# Patient Record
Sex: FEMALE | Race: WHITE | NOT HISPANIC OR LATINO | Employment: FULL TIME | URBAN - METROPOLITAN AREA
[De-identification: names, ages, dates, MRNs, and addresses within clinical notes are randomized per-mention and may not be internally consistent; named-entity substitution may affect disease eponyms.]

---

## 2017-10-16 ENCOUNTER — TELEPHONE (OUTPATIENT)
Dept: NEUROLOGY | Facility: CLINIC | Age: 42
End: 2017-10-16

## 2017-10-16 NOTE — TELEPHONE ENCOUNTER
Called the patient and informed her that we don't have anything available for a sooner appointment. She stated that she will continue to try again so that she may luck up a have a cancellation slot open.

## 2017-10-16 NOTE — TELEPHONE ENCOUNTER
----- Message from Manny Ly sent at 10/16/2017  8:39 AM CDT -----  Contact: Self @ work: [phone] or cell: [phone] (call work until 2:30 PM, then call cell)  Pt would like to know if the doctor has an opening for an earlier date than appt on 01/17/2018.

## 2018-01-02 ENCOUNTER — OFFICE VISIT (OUTPATIENT)
Dept: OBSTETRICS AND GYNECOLOGY | Facility: CLINIC | Age: 43
End: 2018-01-02
Payer: COMMERCIAL

## 2018-01-02 ENCOUNTER — HOSPITAL ENCOUNTER (OUTPATIENT)
Dept: RADIOLOGY | Facility: HOSPITAL | Age: 43
Discharge: HOME | End: 2018-01-02
Attending: OBSTETRICS & GYNECOLOGY
Payer: COMMERCIAL

## 2018-01-02 VITALS
HEIGHT: 62 IN | WEIGHT: 164 LBS | BODY MASS INDEX: 30.31 KG/M2 | WEIGHT: 164.69 LBS | SYSTOLIC BLOOD PRESSURE: 122 MMHG | BODY MASS INDEX: 30.18 KG/M2 | HEIGHT: 62 IN | DIASTOLIC BLOOD PRESSURE: 76 MMHG

## 2018-01-02 DIAGNOSIS — Z12.31 VISIT FOR SCREENING MAMMOGRAM: Primary | ICD-10-CM

## 2018-01-02 DIAGNOSIS — N76.0 BV (BACTERIAL VAGINOSIS): ICD-10-CM

## 2018-01-02 DIAGNOSIS — B96.89 BV (BACTERIAL VAGINOSIS): ICD-10-CM

## 2018-01-02 DIAGNOSIS — Z12.31 VISIT FOR SCREENING MAMMOGRAM: ICD-10-CM

## 2018-01-02 PROCEDURE — 77063 BREAST TOMOSYNTHESIS BI: CPT | Mod: 26,,, | Performed by: RADIOLOGY

## 2018-01-02 PROCEDURE — 77067 SCR MAMMO BI INCL CAD: CPT | Mod: TC,PN

## 2018-01-02 PROCEDURE — 87220 TISSUE EXAM FOR FUNGI: CPT | Mod: S$GLB,,, | Performed by: OBSTETRICS & GYNECOLOGY

## 2018-01-02 PROCEDURE — 77067 SCR MAMMO BI INCL CAD: CPT | Mod: 26,,, | Performed by: RADIOLOGY

## 2018-01-02 PROCEDURE — 99204 OFFICE O/P NEW MOD 45 MIN: CPT | Mod: 25,S$GLB,, | Performed by: OBSTETRICS & GYNECOLOGY

## 2018-01-02 PROCEDURE — 99999 PR PBB SHADOW E&M-EST. PATIENT-LVL III: CPT | Mod: PBBFAC,,, | Performed by: OBSTETRICS & GYNECOLOGY

## 2018-01-02 PROCEDURE — 87210 SMEAR WET MOUNT SALINE/INK: CPT | Mod: QW,S$GLB,, | Performed by: OBSTETRICS & GYNECOLOGY

## 2018-01-02 RX ORDER — METRONIDAZOLE 7.5 MG/G
1 GEL VAGINAL NIGHTLY
Qty: 70 G | Refills: 3 | Status: SHIPPED | OUTPATIENT
Start: 2018-01-02 | End: 2018-01-07

## 2018-01-02 NOTE — PROGRESS NOTES
"Chief Complaint   Patient presents with    Vaginal Discharge    Breast Pain       History of Present Illness   42 y.o.  female patient presents today for vaginal d/c x 2-3 months amd right breast pain/pulling on and off x 8 months.     Past medical and surgical history reviewed.   I have reviewed the patient's medical history in detail and updated the computerized patient record.    Review of patient's allergies indicates:   Allergen Reactions    Cymbalta [duloxetine] Other (See Comments)         Review of Systems - Negative except HPI  GEN ROS: negative for - chills or fever  Breast ROS: negative for breast lumps  Genito-Urinary ROS: no dysuria, trouble voiding, or hematuria      Physical Examination:  /76   Ht 5' 2" (1.575 m)   Wt 74.7 kg (164 lb 10.9 oz)   LMP 12/19/2017 (Approximate)   BMI 30.12 kg/m²    Constitutional: She is oriented to person, place, and time. She appears well-developed and well-nourished. No distress.   HENT:   Head: Normocephalic and atraumatic.   Eyes: Conjunctivae and EOM are normal. No scleral icterus.   Neck: Normal range of motion. Neck supple. No tracheal deviation present.   Cardiovascular: Normal rate.    Pulmonary/Chest: Effort normal. No respiratory distress. She exhibits no tenderness.  Breasts: are symmetrical   Right breast exhibits no inverted nipple, no mass, no nipple discharge, no skin change and no tenderness.   Left breast exhibits no inverted nipple, no mass, no nipple discharge, no skin change and no tenderness.  Abdominal: Soft. She exhibits no distension and no mass. There is no tenderness. There is no rebound and no guarding.   Genitourinary:    External rectal exam shows no thrombosed external hemorrhoids.    Pelvic exam was performed with patient supine.   No labial fusion.   There is no rash, lesion or injury on the right labia.   There is no rash, lesion or injury on the left labia.   No bleeding and no signs of injury around the vaginal " introitus, urethra is without lesions and well supported. The cervix is visualized with no discharge, lesions or friability. IUD string in place, declined PAP offer.   No vaginal discharge found. Wetprep: occasional cue cells, no yeast or trichomaonds, KPH cpnfirmed and shiff negative.    No significant Cystocele, Enterocele or rectocele, and uterus well supported.   Bimanual exam:   The urethra is normal to palpation and there are no palpable vaginal wall masses.   Uterus is not deviated, not enlarged, not fixed, normal shape and not tender.   Cervix exhibits no motion tenderness.    Right adnexum displays no mass and no tenderness.   Left adnexum displays no mass and no tenderness.  Musculoskeletal: Normal range of motion.   Lymphadenopathy: No inguinal adenopathy present.   Neurological: She is alert and oriented to person, place, and time. Coordination normal.   Skin: Skin is warm and dry. She is not diaphoretic.   Psychiatric: She has a normal mood and affect.         Assessment:  1. Visit for screening mammogram  CANCELED: Mammo Digital Screening Bilat With CAD   2. BV (bacterial vaginosis)  metroNIDAZOLE (METROGEL) 0.75 % vaginal gel       Plan:  mmmogram  metrogel  Recommended PAP when BV cleared, follow up 2-3 months for PAP  Patient informed will be contacted with results within 2 weeks. Encouraged to please call back or email if she has not heard from us by then.

## 2018-01-09 ENCOUNTER — OFFICE VISIT (OUTPATIENT)
Dept: NEUROLOGY | Facility: CLINIC | Age: 43
End: 2018-01-09
Payer: COMMERCIAL

## 2018-01-09 VITALS
DIASTOLIC BLOOD PRESSURE: 76 MMHG | SYSTOLIC BLOOD PRESSURE: 106 MMHG | HEART RATE: 85 BPM | WEIGHT: 162.69 LBS | HEIGHT: 62 IN | BODY MASS INDEX: 29.94 KG/M2

## 2018-01-09 DIAGNOSIS — R41.89 COGNITIVE IMPAIRMENT: Primary | ICD-10-CM

## 2018-01-09 PROCEDURE — 99204 OFFICE O/P NEW MOD 45 MIN: CPT | Mod: S$GLB,,, | Performed by: PSYCHIATRY & NEUROLOGY

## 2018-01-09 PROCEDURE — 99999 PR PBB SHADOW E&M-EST. PATIENT-LVL III: CPT | Mod: PBBFAC,,, | Performed by: PSYCHIATRY & NEUROLOGY

## 2018-01-09 NOTE — PROGRESS NOTES
Name: Mandie Lejeune Head  MRN: [id number]   CSN: [id number]      Date: 01/09/2018    Chief Complaint / Interval History:  Alzheimer's disease    History of Present Illness (HPI):  41 yo started to worry about memory loss 4 years ago.  Saw a neurologist and was reassured.  Gave examples that were milder then to what she is having now.  NOt losing things then.  Had MRI and EEG brain then.  Also saw Dr. Flash Lind.    Now, she is worried about progression again.  Gets tearful about forgetting memories from the past.  Gives recent example of forgetting the china pattern and piece that her mother gave her for xmas this year.  Felt awful that they needed to explain this her.  Also has her kids say that she forgot conversations.  Recall of common conversations at her job is affected.  Forgot the list that she wrote for today.  More word finding difficulties and always on the tip of her tongue.     Attributes some stress from her job.  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    Longstanding photographic memory.    Had gastric bypass 10 years ago, went from 243 to 148, now at about 160-165.      Works in [employer] at [employer].  , 3 kids (2 hers).  Senior, sophomore, 7th grade.    owns an AC heating/electrical.      Nonmotor/Premotor ROS:  Hyposmia (HENT)?No  RBD/sleep issues (Constitutional)?No - sleep apnea prior to gastric bypass  Depression/anxiety (Psychiatric)?Yes - mostly anxiety  Fatigue (Constitutional)?No  Constipation (GI)?Yes - still follows a fairly restrictive diet, heavier on protein  Urinary issues ()?No  Sexual dysfunction ()?No - low energy  Orthostasis (Cardiovascular)?Yes  Leg swelling (Cardiovascular)? No  Falls (Musculoskeletal)?No  Cognitive impairment (Neurologic)?Yes  Psychoses (Psychiatric)?No  Pain/Paresthesia (Neurologic)?No  Visual changes (Eyes)?No  Moles / skin changes (Skin)?No  Stridor / SOB  "(Pulm)?No  Bruising (Heme)?No    Past Medical History: The patient  has a past medical history of Anemia, unspecified; Anxiety state, unspecified; Attention deficit disorder without mention of hyperactivity; Emotional instability; Herpes simplex without mention of complication; Memory loss; Migraine, unspecified, without mention of intractable migraine without mention of status migrainosus; and Unspecified vitamin B deficiency.    Social History: The patient  reports that she quit smoking about 15 years ago. She has never used smokeless tobacco. She reports that she does not drink alcohol or use drugs.    Family History: Their family history includes Diabetes in her maternal grandfather; Heart disease in her paternal grandfather; Hypertension in her father, maternal grandfather, and paternal grandfather; No Known Problems in her mother; Stroke in her maternal grandfather.    Allergies: Cymbalta [duloxetine]     Meds:   Current Outpatient Prescriptions on File Prior to Visit   Medication Sig Dispense Refill    b complex vitamins (B-50 COMPLEX) tablet Take 1 tablet by mouth once daily.      calcium-vitamin D3 500 mg(1,250mg) -200 unit per tablet Take 1 tablet by mouth 2 (two) times daily.      cyanocobalamin 1,000 mcg/mL injection Inject 1.5 mLs (1,500 mcg total) into the muscle every 28 days. 10 mL 0    dextroamphetamine-amphetamine (ADDERALL) 15 mg tablet Take 1 tablet (15 mg total) by mouth 2 (two) times daily. 60 tablet 0    docusate sodium (COLACE) 100 MG capsule Take 100 mg by mouth 2 (two) times daily as needed for Constipation.      multivitamin (ONE DAILY MULTIVITAMIN) per tablet Take 1 tablet by mouth once daily.       No current facility-administered medications on file prior to visit.        Exam:  /76   Pulse 85   Ht 5' 2" (1.575 m)   Wt 73.8 kg (162 lb 11.2 oz)   LMP 12/19/2017 (Approximate)   BMI 29.76 kg/m²     Constitutional  Well-developed, well-nourished, appears stated age "   Ophthalmoscopic  No papilledema with no hemorrhages or exudates bilaterally   Cardiovascular  Radial pulses 2+ and symmetric, no LE edema bilaterally   Neurological    * Mental status  MOCA = 28/30      - Orientation  Oriented to person, place, time, and situation     - Memory   3/5     - Attention/concentration  Attentive, vigilant during exam     - Language  Naming & repetition intact, +2-step commands     - Fund of knowledge  Aware of current events     - Executive  Well-organized thoughts     - Other     * Cranial nerves       - CN II  PERRL, visual fields full to confrontation     - CN III, IV, VI  Extraocular movements full, normal pursuits and saccades     - CN V  Sensation V1 - V3 intact     - CN VII  Face strong and symmetric bilaterally     - CN VIII  Hearing intact bilaterally     - CN IX, X  Palate raises midline and symmetric     - CN XI  SCM and trapezius 5/5 bilaterally     - CN XII  Tongue midline   * Motor  Muscle bulk normal, strength 5/5 throughout   * Sensory   Intact to temperature and vibration throughout   * Coordination  No dysmetria with finger-to-nose or heel-to-shin   * Gait  See below.   * Deep tendon reflexes  2+ and symmetric throughout   Babinski downgoing bilaterally   * Specialized movement exam  No hypophonic speech.    No facial masking.   No cogwheel rigidity.     No bradykinesia.   No tremor with rest, posture, kinesis, or intention.    No other dystonia, chorea, athetosis, myoclonus, or tics.   No motor impersistence.   Normal-based gait.   No shortened stride length.   No abnormal arm swing.     No postural instability.      Laboratory/Radiological:  - Results:  Lab Visit on 01/08/2018   Component Date Value Ref Range Status    Iron 01/08/2018 31  30 - 160 ug/dL Final    TIBC 01/08/2018 428  265 - 497 ug/dL Final    Iron Saturation 01/08/2018 7* 20 - 50 % Final    Vitamin B-12 01/08/2018 521  210 - 950 pg/mL Final    Folate 01/08/2018 >20.0  4.0 - 24.0 ng/mL Final   Lab  Visit on 01/05/2018   Component Date Value Ref Range Status    WBC 01/05/2018 9.63  3.90 - 12.70 K/uL Final    RBC 01/05/2018 4.64  4.00 - 5.40 M/uL Final    Hemoglobin 01/05/2018 9.4* 12.0 - 16.0 g/dL Final    Hematocrit 01/05/2018 31.9* 37.0 - 48.5 % Final    MCV 01/05/2018 69* 82 - 98 fL Final    MCH 01/05/2018 20.3* 27.0 - 31.0 pg Final    MCHC 01/05/2018 29.5* 32.0 - 36.0 g/dL Final    RDW 01/05/2018 21.9* 11.5 - 14.5 % Final    Platelets 01/05/2018 416* 150 - 350 K/uL Final    MPV 01/05/2018 SEE COMMENT  9.2 - 12.9 fL Final    Gran # 01/05/2018 6.3  1.8 - 7.7 K/uL Final    Lymph # 01/05/2018 2.5  1.0 - 4.8 K/uL Final    Mono # 01/05/2018 0.6  0.3 - 1.0 K/uL Final    Eos # 01/05/2018 0.1  0.0 - 0.5 K/uL Final    Baso # 01/05/2018 0.06  0.00 - 0.20 K/uL Final    nRBC 01/05/2018 0  0 /100 WBC Final    Gran% 01/05/2018 65.2  38.0 - 73.0 % Final    Lymph% 01/05/2018 26.2  18.0 - 48.0 % Final    Mono% 01/05/2018 6.5  4.0 - 15.0 % Final    Eosinophil% 01/05/2018 1.5  0.0 - 8.0 % Final    Basophil% 01/05/2018 0.6  0.0 - 1.9 % Final    Platelet Estimate 01/05/2018 Increased*  Final    Large/Giant Platelets 01/05/2018 Present   Final    Differential Method 01/05/2018 Automated   Final    Shiv-Barr Virus Capsid Ag IgG 01/05/2018 105.0* 0.0 - 21.9 U/mL Final    Shiv-Barr Virus Capsid Ag IgM 01/05/2018 12.1  0.0 - 43.9 U/mL Final    CMV IgG Interpretation 01/05/2018 Non-Reactive   Final    Cytomegalovirus IgM Ab 01/05/2018 <8.0  <=29.9 AU/mL Final       - Independent review of images:    Diagnoses:       Diagnoses:          1) Favor subtle change in cognition, perhaps attention or mild memory changes.  [facility] doies not fully evaluate her as she is likely premorbidly quite intelligent..  Also has persistent anemia which ciould be a factor - getting ferritin studies now.    Medical Decision Making:  Best practice for evaluation and management of dementia/memory loss to follow.  Will help  differentiate potential causes of progressive neurodegenerative disease.    1) Reversible labs - ordered, attention to anemia  2) Imaging - ordered  3) Formal neuropsychology evaluation - ordered  4) Symptomatic treatment with medications:      N/a yet  5) Management of behavioral symptoms - depression/psychosis/agitation/wandering:      Will watch for anxiwty  6) Improve functional performance and maintain exercise  7) Mediterranean-style diet or similar discussed      Kp Johnston MD, MPH  Division of Movement and Memory Disorders  [facility]  [phone]

## 2018-02-07 PROBLEM — D64.9 ANEMIA: Status: ACTIVE | Noted: 2018-02-07

## 2018-04-16 ENCOUNTER — OFFICE VISIT (OUTPATIENT)
Dept: NEUROLOGY | Facility: CLINIC | Age: 43
End: 2018-04-16
Payer: COMMERCIAL

## 2018-04-16 VITALS
DIASTOLIC BLOOD PRESSURE: 89 MMHG | BODY MASS INDEX: 28.44 KG/M2 | WEIGHT: 160.5 LBS | HEIGHT: 63 IN | HEART RATE: 84 BPM | SYSTOLIC BLOOD PRESSURE: 119 MMHG

## 2018-04-16 DIAGNOSIS — R41.3 MEMORY LOSS: Primary | ICD-10-CM

## 2018-04-16 PROCEDURE — 99999 PR PBB SHADOW E&M-EST. PATIENT-LVL III: CPT | Mod: PBBFAC,,, | Performed by: PSYCHIATRY & NEUROLOGY

## 2018-04-16 PROCEDURE — 99214 OFFICE O/P EST MOD 30 MIN: CPT | Mod: S$GLB,,, | Performed by: PSYCHIATRY & NEUROLOGY

## 2018-04-16 RX ORDER — BISACODYL 5 MG
TABLET, DELAYED RELEASE (ENTERIC COATED) ORAL
Refills: 0 | COMMUNITY
Start: 2018-02-05 | End: 2020-11-30

## 2018-04-16 RX ORDER — BISMUTH SUBCITRATE POTASSIUM, METRONIDAZOLE, TETRACYCLINE HYDROCHLORIDE 140; 125; 125 MG/1; MG/1; MG/1
CAPSULE ORAL
Refills: 0 | COMMUNITY
Start: 2018-02-08 | End: 2018-08-14 | Stop reason: ALTCHOICE

## 2018-04-16 NOTE — PROGRESS NOTES
Name: Mandie Lejeune Head  MRN: [id number]   CSN: [id number]      Date: 04/16/2018    Chief Complaint / Interval History:  Cog changes  - generally improved, feels relieved  - does still have more word finding difficulties in general  - wakes up refreshed  - gastric bypass has remained successful, diet is clean in general  - not taking her oral B vitamins as well as she is taking the others  - kids are doing well at home    History of Present Illness (HPI):  43 yo started to worry about memory loss 4 years ago.  Saw a neurologist and was reassured.  Gave examples that were milder then to what she is having now.  NOt losing things then.  Had MRI and EEG brain then.  Also saw Dr. Flash Lind.    Now, she is worried about progression again.  Gets tearful about forgetting memories from the past.  Gives recent example of forgetting the china pattern and piece that her mother gave her for xmas this year.  Felt awful that they needed to explain this her.  Also has her kids say that she forgot conversations.  Recall of common conversations at her job is affected.  Forgot the list that she wrote for today.  More word finding difficulties and always on the tip of her tongue.     Attributes some stress from her job.  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    Longstanding photographic memory.    Had gastric bypass 10 years ago, went from 243 to 148, now at about 160-165.      Works in "[employer] [place]" at [employer].  , 3 kids (2 hers).  Senior, sophomore, 7th grade.    owns an AC heating/electrical.      Nonmotor/Premotor ROS:  Hyposmia (HENT)?No  RBD/sleep issues (Constitutional)?No - sleep apnea prior to gastric bypass  Depression/anxiety (Psychiatric)?Yes - mostly anxiety  Fatigue (Constitutional)?No  Constipation (GI)?Yes - still follows a fairly restrictive diet, heavier on protein  Urinary issues ()?No  Sexual dysfunction ()?No - low  energy  Orthostasis (Cardiovascular)?Yes  Leg swelling (Cardiovascular)? No  Falls (Musculoskeletal)?No  Cognitive impairment (Neurologic)?Yes  Psychoses (Psychiatric)?No  Pain/Paresthesia (Neurologic)?No  Visual changes (Eyes)?No  Moles / skin changes (Skin)?No  Stridor / SOB (Pulm)?No  Bruising (Heme)?No    Past Medical History: The patient  has a past medical history of Anemia, unspecified; Anxiety state, unspecified; Attention deficit disorder without mention of hyperactivity; Emotional instability; Herpes simplex without mention of complication; Memory loss; Migraine, unspecified, without mention of intractable migraine without mention of status migrainosus; and Unspecified vitamin B deficiency.    Social History: The patient  reports that she quit smoking about 15 years ago. She has never used smokeless tobacco. She reports that she does not drink alcohol or use drugs.    Family History: Their family history includes Diabetes in her maternal grandfather; Heart disease in her paternal grandfather; Hypertension in her father, maternal grandfather, and paternal grandfather; No Known Problems in her mother; Stroke in her maternal grandfather.    Allergies: Cymbalta [duloxetine]     Meds:   Current Outpatient Prescriptions on File Prior to Visit   Medication Sig Dispense Refill    b complex vitamins (B-50 COMPLEX) tablet Take 1 tablet by mouth once daily.      calcium-vitamin D3 500 mg(1,250mg) -200 unit per tablet Take 1 tablet by mouth 2 (two) times daily.      cyanocobalamin 1,000 mcg/mL injection Inject 1.5 mLs (1,500 mcg total) into the muscle every 28 days. 10 mL 0    dextroamphetamine-amphetamine (ADDERALL XR) 30 MG 24 hr capsule Take 1 capsule (30 mg total) by mouth every morning. 30 capsule 0    docusate sodium (COLACE) 100 MG capsule Take 100 mg by mouth 2 (two) times daily as needed for Constipation.      ferrous gluconate (FERGON) 324 MG tablet Take 1 tablet (324 mg total) by mouth daily with  "breakfast.      multivitamin (ONE DAILY MULTIVITAMIN) per tablet Take 1 tablet by mouth once daily.       No current facility-administered medications on file prior to visit.        Exam:  /89   Pulse 84   Ht 5' 2.5" (1.588 m)   Wt 72.8 kg (160 lb 7.9 oz)   BMI 28.89 kg/m²     Constitutional  Well-developed, well-nourished, appears stated age   Ophthalmoscopic  No papilledema with no hemorrhages or exudates bilaterally   Cardiovascular  Radial pulses 2+ and symmetric, no LE edema bilaterally   Neurological    * Mental status  MOCA deferred     - Orientation  Oriented to person, place, time, and situation     - Memory   Not detailed tested     - Attention/concentration  Attentive, vigilant during exam     - Language  Naming & repetition intact, +2-step commands     - Fund of knowledge  Aware of current events; Hodan Pierre, Renaldo, Reymundo, Renaldo, Porfirio     - Executive  Well-organized thoughts     - Other  Makes 41 cents   * Cranial nerves       - CN II  PERRL, visual fields full to confrontation     - CN III, IV, VI  Extraocular movements full, normal pursuits and saccades     - CN V  Sensation V1 - V3 intact     - CN VII  Face strong and symmetric bilaterally     - CN VIII  Hearing intact bilaterally     - CN IX, X  Palate raises midline and symmetric     - CN XI  SCM and trapezius 5/5 bilaterally     - CN XII  Tongue midline   * Motor  Muscle bulk normal, strength 5/5 throughout   * Sensory   Intact to temperature and vibration throughout   * Coordination  No dysmetria with finger-to-nose or heel-to-shin   * Gait  See below.   * Deep tendon reflexes  2+ and symmetric throughout   Babinski downgoing bilaterally     Laboratory/Radiological:  - Results:  Lab Visit on 03/08/2018   Component Date Value Ref Range Status    Iron 03/08/2018 34  30 - 160 ug/dL Final    TIBC 03/08/2018 416  265 - 497 ug/dL Final    Iron Saturation 03/08/2018 8* 20 - 50 % Final    Ferritin 03/08/2018 7  7 - 137 ng/mL Final "       Diagnoses:          1) Favor subtle change in cognition, perhaps attention or mild memory changes.  [facility] doies not fully evaluate her as she is likely premorbidly quite intelligent..  Also has persistent anemia which ciould be a factor - now on supplementation!    Medical Decision Making:  - appreciate the likely improvement  - reviewed nP testing  - following healthy brain practices  - will followup prn    Kp Johnston MD, MPH  Division of Movement and Memory Disorders  [facility]  [phone]

## 2018-04-16 NOTE — LETTER
April 19, 2018      Natanael Quinn MD  [address] [place] [address] [address] Hermann MANN [id number]           [facility]  [address]  [place] [address]  Phone: [phone]  Fax: [phone]          Patient: Mandie Lejeune Head   MR Number: [id number]   YOB: 1975   Date of Visit: 4/16/2018       Dear Dr. Natanael Quinn:    Thank you for referring Shi Saleh to me for evaluation. Attached you will find relevant portions of my assessment and plan of care.    If you have questions, please do not hesitate to call me. I look forward to following Shi Saleh along with you.    Sincerely,        Enclosure  CC:  No Recipients    If you would like to receive this communication electronically, please contact [email or web address] or [phone] to request more information on [employer] Link access.    For providers and/or their staff who would like to refer a patient to [facility], please contact us through our one-stop-shop provider referral line, Justin Nolasco, at [phone].    If you feel you have received this communication in error or would no longer like to receive these types of communications, please e-mail [email or web address]

## 2018-11-27 ENCOUNTER — TELEPHONE (OUTPATIENT)
Dept: OBSTETRICS AND GYNECOLOGY | Facility: CLINIC | Age: 43
End: 2018-11-27

## 2018-11-27 NOTE — TELEPHONE ENCOUNTER
----- Message from Martha Forte sent at 11/27/2018 12:44 PM CST -----  Contact: self  Patient [phone] is calling to schedule an appt to have her IUD removed and replaced/please call patient to schedule    Patient scheduled for annual and discuss IUD replacement

## 2018-11-28 ENCOUNTER — OFFICE VISIT (OUTPATIENT)
Dept: OBSTETRICS AND GYNECOLOGY | Facility: CLINIC | Age: 43
End: 2018-11-28
Payer: COMMERCIAL

## 2018-11-28 VITALS
DIASTOLIC BLOOD PRESSURE: 86 MMHG | SYSTOLIC BLOOD PRESSURE: 124 MMHG | WEIGHT: 153.44 LBS | BODY MASS INDEX: 27.62 KG/M2

## 2018-11-28 DIAGNOSIS — Z30.430 ENCOUNTER FOR INSERTION OF INTRAUTERINE CONTRACEPTIVE DEVICE (IUD): ICD-10-CM

## 2018-11-28 DIAGNOSIS — Z12.4 PAP SMEAR FOR CERVICAL CANCER SCREENING: Primary | ICD-10-CM

## 2018-11-28 PROCEDURE — 99999 PR PBB SHADOW E&M-EST. PATIENT-LVL III: CPT | Mod: PBBFAC,,, | Performed by: OBSTETRICS & GYNECOLOGY

## 2018-11-28 PROCEDURE — 87624 HPV HI-RISK TYP POOLED RSLT: CPT

## 2018-11-28 PROCEDURE — 99396 PREV VISIT EST AGE 40-64: CPT | Mod: S$GLB,,, | Performed by: OBSTETRICS & GYNECOLOGY

## 2018-11-28 PROCEDURE — 88175 CYTOPATH C/V AUTO FLUID REDO: CPT

## 2018-11-28 RX ORDER — TIZANIDINE 4 MG/1
TABLET ORAL
Qty: 30 TABLET | Refills: 3 | Status: SHIPPED | OUTPATIENT
Start: 2018-11-28 | End: 2019-07-13 | Stop reason: SDUPTHER

## 2018-12-03 ENCOUNTER — TELEPHONE (OUTPATIENT)
Dept: OBSTETRICS AND GYNECOLOGY | Facility: CLINIC | Age: 43
End: 2018-12-03

## 2018-12-03 LAB
HPV HR 12 DNA CVX QL NAA+PROBE: NEGATIVE
HPV16 AG SPEC QL: NEGATIVE
HPV18 DNA SPEC QL NAA+PROBE: NEGATIVE

## 2018-12-03 NOTE — TELEPHONE ENCOUNTER
----- Message from Calista Gonzales sent at 12/3/2018  3:55 PM CST -----  Type: Needs Medical Advice    Who Called:  Patient   Best Call Back Number:[phone]  Additional Information: patient has a procedure tomorrow but is spotting, please contact to advise if patient should still come in.    Thank you

## 2018-12-04 ENCOUNTER — OFFICE VISIT (OUTPATIENT)
Dept: OBSTETRICS AND GYNECOLOGY | Facility: CLINIC | Age: 43
End: 2018-12-04
Payer: COMMERCIAL

## 2018-12-04 VITALS
WEIGHT: 152.56 LBS | DIASTOLIC BLOOD PRESSURE: 68 MMHG | SYSTOLIC BLOOD PRESSURE: 112 MMHG | BODY MASS INDEX: 27.46 KG/M2

## 2018-12-04 DIAGNOSIS — Z30.433 ENCOUNTER FOR REMOVAL AND REINSERTION OF INTRAUTERINE CONTRACEPTIVE DEVICE (IUD): Primary | ICD-10-CM

## 2018-12-04 PROCEDURE — 99213 OFFICE O/P EST LOW 20 MIN: CPT | Mod: 25,S$GLB,, | Performed by: OBSTETRICS & GYNECOLOGY

## 2018-12-04 PROCEDURE — 58301 REMOVE INTRAUTERINE DEVICE: CPT | Mod: S$GLB,,, | Performed by: OBSTETRICS & GYNECOLOGY

## 2018-12-04 PROCEDURE — 58300 INSERT INTRAUTERINE DEVICE: CPT | Mod: 51,S$GLB,, | Performed by: OBSTETRICS & GYNECOLOGY

## 2018-12-04 PROCEDURE — 88300 SURGICAL PATH GROSS: CPT | Mod: 26,,, | Performed by: PATHOLOGY

## 2018-12-04 PROCEDURE — 99999 PR PBB SHADOW E&M-EST. PATIENT-LVL III: CPT | Mod: PBBFAC,,, | Performed by: OBSTETRICS & GYNECOLOGY

## 2018-12-04 PROCEDURE — 88300 SURGICAL PATH GROSS: CPT | Performed by: PATHOLOGY

## 2018-12-04 NOTE — PROGRESS NOTES
History of Present Illness:   Pateint presents today 5 years status post Intrauterine Device  Insertion without complaint- due for removal.    Pathology: none    Physical exam:  /68   Wt 69.2 kg (152 lb 8.9 oz)   LMP  (LMP Unknown)   BMI 27.46 kg/m²   Constitutional: She is oriented to person, place, and time. She appears well-developed and well-nourished. No distress.   HENT:   Head: Normocephalic and atraumatic.   Eyes: Conjunctivae and EOM are normal. No scleral icterus.   Neck: Normal range of motion. Neck supple. No tracheal deviation present.   Cardiovascular: Normal rate.    Pulmonary/Chest: Effort normal. No respiratory distress. She exhibits no tenderness.  Breasts: deferred  Abdominal: Soft. She exhibits no distension and no mass.  The incision is healing well. There is no rebound and no guarding.   Genitourinary:     External rectal exam shows no thrombosed external hemorrhoids.    Pelvic exam was performed with patient supine.   There is no rash, lesion or injury on the right labia.   There is no rash, lesion or injury on the left labia.   No bleeding and no signs of injury around the vaginal introitus, urethra is without lesions and well supported. The cervix is visualized with no discharge, lesions or friability. Intrauterine Device string easily visualized. Removed without difficulty   No vaginal discharge found.    No significant Cystocele, Enterocele or rectocele, and uterus well supported.   Bimanual exam:   The urethra is normal to palpation and there are no palpable vaginal wall masses.   Uterus is not deviated, not enlarged, not fixed, normal shape and not tender.   Cervix exhibits no motion tenderness.    Right adnexum displays no mass and no tenderness.   Left adnexum displays no mass and no tenderness.  Neurological: She is alert and oriented to person, place, and time. Coordination normal.   Skin: Skin is warm and dry. She is not diaphoretic.   Psychiatric: She has a normal mood and  affect.          Intrauterine device Placement:  12/4/2018      PRE-IUD PLACEMENT COUNSELING:  All contraceptive options were reviewed and the patient chooses an IUD.  The patient's history was reviewed and there are no contraindications to an IUD. The procedure and minimal risks of pain, bleeding, perforation and infection at the insertion and spontaneous expulsion within the first two weeks was discussed. The benefits of amenorrhea and no systemic side effects were explained. All questions were answered and the patient agrees to proceed. Consent was signed (scanned into computer).    EXAM:  Uterine Position: mid    PROCEDURE:  TIME OUT PERFORMED.  The cervix visualized with a speculum.  A single tooth tenaculum was not placed on the anterior lip.  The uterus sounded to 8cm using sterile technique.  A Mirena IUD (lot#[id number])  was loaded and placed high in uterine fundus without difficulty using sterile technique.  The string was cut to 2cm length from exo cervix.   All instruments were removed from the cervix and vagina and the procedure was tolerated well.     ASSESSMENT:  1. Contraception management / IUD insertion.V25.0.    POST IUD PLACEMENT COUNSELING:  Manage post IUD placement pain with NSAIDs, Tylenol or Rx per Med[employer].  IUD danger signs and how to check the strings.  Removal in 5 years for Mirena IUD and in 10 years for Copper IUD.    Counseling lasted approximately 15 minutes and all her questions were answered.    FOLLOW-UP: With me in four weeks.

## 2018-12-19 ENCOUNTER — TELEPHONE (OUTPATIENT)
Dept: OBSTETRICS AND GYNECOLOGY | Facility: CLINIC | Age: 43
End: 2018-12-19

## 2018-12-19 NOTE — TELEPHONE ENCOUNTER
----- Message from Alissa Olivarez sent at 12/19/2018 11:22 AM CST -----  Type: Needs Medical Advice    Who Called:  pt  Symptoms (please be specific):   Poss  Yeast  infection  How long has patient had these symptoms:  On  And  Off a  Few  months  Pharmacy name and phone #:   [facility] - - [address] [address]  Phone: [phone] Fax: [phone]  Best Call Back Number: [phone]  Additional Information:   Please call  For  All details  Asking  For advice     Appointment scheduled for tomorrow, c/o vaginal bump and discomfort

## 2018-12-20 ENCOUNTER — OFFICE VISIT (OUTPATIENT)
Dept: OBSTETRICS AND GYNECOLOGY | Facility: CLINIC | Age: 43
End: 2018-12-20
Attending: OBSTETRICS & GYNECOLOGY
Payer: COMMERCIAL

## 2018-12-20 VITALS
SYSTOLIC BLOOD PRESSURE: 98 MMHG | WEIGHT: 152.56 LBS | HEIGHT: 63 IN | BODY MASS INDEX: 27.03 KG/M2 | DIASTOLIC BLOOD PRESSURE: 60 MMHG

## 2018-12-20 DIAGNOSIS — N89.8 VAGINAL DISCHARGE: Primary | ICD-10-CM

## 2018-12-20 DIAGNOSIS — N94.2 VAGINISMUS: ICD-10-CM

## 2018-12-20 PROCEDURE — 87660 TRICHOMONAS VAGIN DIR PROBE: CPT

## 2018-12-20 PROCEDURE — 87220 TISSUE EXAM FOR FUNGI: CPT | Mod: S$GLB,,, | Performed by: OBSTETRICS & GYNECOLOGY

## 2018-12-20 PROCEDURE — 99213 OFFICE O/P EST LOW 20 MIN: CPT | Mod: 25,S$GLB,, | Performed by: OBSTETRICS & GYNECOLOGY

## 2018-12-20 PROCEDURE — 87210 SMEAR WET MOUNT SALINE/INK: CPT | Mod: QW,S$GLB,, | Performed by: OBSTETRICS & GYNECOLOGY

## 2018-12-20 PROCEDURE — 99999 PR PBB SHADOW E&M-EST. PATIENT-LVL IV: CPT | Mod: PBBFAC,,, | Performed by: OBSTETRICS & GYNECOLOGY

## 2018-12-20 NOTE — PROGRESS NOTES
"Chief Complaint   Patient presents with    Vaginal Discharge    discharge and itching       History of Present Illness   43 y.o.  female patient presents today for vaginal irritation and itching x 2-3 months on and off. Mild discharge.     Past medical and surgical history reviewed.   I have reviewed the patient's medical history in detail and updated the computerized patient record.    Review of patient's allergies indicates:   Allergen Reactions    Cymbalta [duloxetine] Other (See Comments)         Review of Systems - Negative except HPI  GEN ROS: negative for - chills or fever  Breast ROS: negative for breast lumps  Genito-Urinary ROS: no dysuria, trouble voiding, or hematuria      Physical Examination:  BP 98/60   Ht 5' 2.5" (1.588 m)   Wt 69.2 kg (152 lb 8.9 oz)   LMP  (Exact Date)   BMI 27.46 kg/m²    Constitutional: She appears alert and responsive. She appears well-developed, well-groomed, and well-nourished. No distress.OverWeight   HENT:   Head: Normocephalic and atraumatic.   Eyes: Conjunctivae and EOM are normal. No scleral icterus.   Neck: Symmetrical. Normal range of motion. Neck supple. No tracheal deviation present. THYROID:  without masses or tenderness.  Cardiovascular: Normal rate, no rhythm abnormality noted. Extremities without swelling or edema, warm.    Pulmonary/Chest: Normal respiratory Effort. No distress or retractions. She exhibits no tenderness.  Abdominal: Soft. She exhibits no distension, hernias or masses. There is no tenderness. No enlargement of liver edge or spleen.  There is no rebound and no guarding.   Genitourinary:    External rectal exam shows no thrombosed external hemorrhoids, no lesions.     Pelvic exam was performed with patient supine.   No labial fusion, and symmetrical.    There is no rash, lesion or injury on the right labia.   There is no rash, lesion or injury on the left labia.   No bleeding and no signs of injury around the vaginal introitus, " urethral meatus is normal size and without prolapse or lesions, urethra well supported. The cervix is visualized with no discharge, lesions or friability.   No vaginal discharge found. Wetprep: no clue cells, yeast or trichomoands. gurdeep confirms and whiff negative   No significant Cystocele, Enterocele or rectocele, and cervix and uterus well supported.   Bimanual exam:   The urethra is normal to palpation and there are no palpable vaginal wall masses.   Uterus is not deviated, not enlarged, not fixed, normal shape and not tender.   Cervix exhibits no motion tenderness.    Right adnexum displays no mass or nodularity and no tenderness.   Left adnexum displays no mass or nodularity and no tenderness.  Musculoskeletal: Normal range of motion.   Lymphadenopathy: No inguinal adenopathy present.   Neurological: She is alert and oriented to person, place, and time. Coordination normal.   Skin: Skin is warm and dry. She is not diaphoretic. No rashes, lesions or ulcers.   Psychiatric: She has a normal mood and affect, oriented to person, place, and time.            Assessment:  Itching - normal wet prep  1. Vaginal discharge  Vaginosis Screen by DNA Probe       Plan:  Affirm - will treat based on that, if negative will try yeast suppository  Patient informed will be contacted with results within 2 weeks. Encouraged to please call back or email if she has not heard from us by then.

## 2018-12-21 ENCOUNTER — PATIENT MESSAGE (OUTPATIENT)
Dept: OBSTETRICS AND GYNECOLOGY | Facility: CLINIC | Age: 43
End: 2018-12-21

## 2018-12-21 LAB
CANDIDA RRNA VAG QL PROBE: NEGATIVE
G VAGINALIS RRNA GENITAL QL PROBE: NEGATIVE
T VAGINALIS RRNA GENITAL QL PROBE: NEGATIVE

## 2018-12-21 RX ORDER — HYDROCORTISONE ACETATE 25 MG/1
SUPPOSITORY RECTAL
Qty: 12 SUPPOSITORY | Refills: 3 | Status: SHIPPED | OUTPATIENT
Start: 2018-12-21 | End: 2019-02-27

## 2019-07-15 RX ORDER — TIZANIDINE 4 MG/1
TABLET ORAL
Qty: 30 TABLET | Refills: 3 | Status: SHIPPED | OUTPATIENT
Start: 2019-07-15 | End: 2020-07-31

## 2020-01-15 NOTE — PROGRESS NOTES
Chief Complaint   Patient presents with    Well Woman       History and Physical:  No LMP recorded (lmp unknown). Patient has had an implant.       Mandie Lejeune Head is a 43 y.o.  female who presents today for her routine annual GYN exam. The patient has no Gynecology complaints today. No bowel or bladder complaints. - request IUD replacement- due out 2019, also refill on zanaflex for dyspareunia.       Allergies:   Review of patient's allergies indicates:   Allergen Reactions    Cymbalta [duloxetine] Other (See Comments)       Past Medical History:   Diagnosis Date    Anemia, unspecified     Anxiety state, unspecified     Attention deficit disorder without mention of hyperactivity     Emotional instability     Herpes simplex without mention of complication     vaginal    Memory loss     Migraine, unspecified, without mention of intractable migraine without mention of status migrainosus     Unspecified vitamin B deficiency        Past Surgical History:   Procedure Laterality Date    breast reduction      COLONOSCOPY N/A 2/7/2018    Procedure: COLONOSCOPY;  Surgeon: NELLY Naranjo MD;  Location: [facility];  Service: Endoscopy;  Laterality: N/A;    COLONOSCOPY N/A 2/7/2018    Performed by NELLY Naranjo MD at [facility]    ESOPHAGOGASTRODUODENOSCOPY (EGD) N/A 2/7/2018    Performed by NE[facility]aranjo MD at [facility]    GASTRIC BYPASS      TOTAL REDUCTION MAMMOPLASTY      Vaginal Fissure         MEDS:   Current Outpatient Medications on File Prior to Visit   Medication Sig Dispense Refill    b complex vitamins (B-50 COMPLEX) tablet Take 1 tablet by mouth once daily.      calcium-vitamin D3 500 mg(1,250mg) -200 unit per tablet Take 1 tablet by mouth 2 (two) times daily.      cyanocobalamin 1,000 mcg/mL injection Inject 1.5 mLs (1,500 mcg total) into the muscle every 28 days. 10 mL 0    dextroamphetamine-amphetamine (ADDERALL XR) 30 MG 24 hr capsule TAKE 1 CAPSULE BY MOUTH  Lab Results   Component Value Date    HGBA1C 5 5 05/21/2019       COMPLIANT WITH MEDICATION  DENIES ANY NUMBNESS, TINGLING IN FEET    - DISCUSSED DIETARY MODIFICATION OF CARBOHYDRATES  - HGB A1C AND URINE STUDIES DUE TODAY  - FOOT CARE  - RV 6 MONTHS EVERY MORNING 30 capsule 0    ferrous gluconate (FERGON) 324 MG tablet Take 1 tablet (324 mg total) by mouth daily with breakfast.      multivitamin (ONE DAILY MULTIVITAMIN) per tablet Take 1 tablet by mouth once daily.      bisacodyl (DULCOLAX) 5 mg EC tablet TAKE 2 TABLETS BY MOUTH as directed for 1 day  0    docusate sodium (COLACE) 100 MG capsule Take 100 mg by mouth 2 (two) times daily as needed for Constipation.       No current facility-administered medications on file prior to visit.        OB History      Para Term  AB Living    4 4 2          SAB TAB Ectopic Multiple Live Births                       Social History     Socioeconomic History    Marital status:      Spouse name: Not on file    Number of children: Not on file    Years of education: Not on file    Highest education level: Not on file   Social Needs    Financial resource strain: Not on file    Food insecurity - worry: Not on file    Food insecurity - inability: Not on file    Transportation needs - medical: Not on file    Transportation needs - non-medical: Not on file   Occupational History    Not on file   Tobacco Use    Smoking status: Former Smoker     Last attempt to quit: 2002     Years since quittin.2    Smokeless tobacco: Never Used   Substance and Sexual Activity    Alcohol use: No    Drug use: No    Sexual activity: Yes     Partners: Male     Birth control/protection: IUD   Other Topics Concern    Not on file   Social History Narrative    Not on file       Family History   Problem Relation Age of Onset    No Known Problems Mother     Hypertension Father     Diabetes Maternal Grandfather     Stroke Maternal Grandfather     Hypertension Maternal Grandfather     Heart disease Paternal Grandfather     Hypertension Paternal Grandfather     Breast cancer Neg Hx     Ovarian cancer Neg Hx          Past medical and surgical history reviewed.   I have reviewed the patient's medical  history in detail and updated the computerized patient record.        Review of System:   General: no chills, fever, night sweats, weight gain or weight loss  Psychological: no depression or suicidal ideation  Breasts: no new or changing breast lumps, nipple discharge or masses.  Respiratory: no cough, shortness of breath, or wheezing  Cardiovascular: no chest pain or dyspnea on exertion  Gastrointestinal: no abdominal pain, change in bowel habits, or black or bloody stools  Genito-Urinary: no incontinence, urinary frequency/urgency or vulvar/vaginal symptoms, pelvic pain or abnormal vaginal bleeding.  Musculoskeletal: no gait disturbance or muscular weakness      Physical Exam:   /86   Wt 69.6 kg (153 lb 7 oz)   LMP  (LMP Unknown)   BMI 27.62 kg/m²   Constitutional: She appears alert and responsive. She appears well-developed, well-groomed, and well-nourished. No distress. Thin  HENT:   Head: Normocephalic and atraumatic.   Eyes: Conjunctivae and EOM are normal. No scleral icterus.   Neck: Symmetrical. Normal range of motion. Neck supple. No tracheal deviation present. THYROID without masses or tenderness.  Cardiovascular: Normal rate, no rhythm abnormality noted. Extremities without swelling or edema, warm.    Pulmonary/Chest: Normal respiratory Effort. No distress or retractions. She exhibits no tenderness.  Breasts: are symmetrical. Well healed scars   Right breast exhibits no inverted nipple, no mass, no nipple discharge, no skin change and no tenderness.   Left breast exhibits no inverted nipple, no mass, no nipple discharge, no skin change and no tenderness.  Abdominal: Soft. She exhibits no distension, hernias or masses. There is no tenderness. No enlargement of liver edge or spleen.  There is no rebound and no guarding.   Genitourinary:    External rectal exam shows no thrombosed external hemorrhoids, no lesions.     Pelvic exam was performed with patient supine.   No labial fusion, and  symmetrical.    There is no rash, lesion or injury on the right labia.   There is no rash, lesion or injury on the left labia.   No bleeding and no signs of injury around the vaginal introitus, urethral meatus is normal size and without prolapse or lesions, urethra well supported. The cervix is visualized with no discharge, lesions or friability. IUD string in place.    No vaginal discharge found.   No significant Cystocele, Enterocele or rectocele, and cervix and uterus well supported.   Bimanual exam:   The urethra is normal to palpation and there are no palpable vaginal wall masses.   Uterus is not deviated, not enlarged, not fixed, normal shape and not tender.   Cervix exhibits no motion tenderness.    Right adnexum displays no mass or nodularity and no tenderness.   Left adnexum displays no mass or nodularity and no tenderness.  Musculoskeletal: Normal range of motion.   Lymphadenopathy: No inguinal adenopathy present.   Neurological: She is alert and oriented to person, place, and time. Coordination normal.   Skin: Skin is warm and dry. She is not diaphoretic. No rashes, lesions or ulcers.   Psychiatric: She has a normal mood and affect, oriented to person, place, and time.      Assessment:   Normal annual GYN exam  1. Pap smear for cervical cancer screening  Liquid-based pap smear, screening    HPV High Risk Genotypes, PCR   dyspareunia- doing well on as needed zanaflex  IUD due out    Plan:   PAP  Mammogram  Refill zanaflex, order mirena iud for removal and replacement.   Follow up in 1 month.  Patient informed will be contacted with results within 2 weeks. Encouraged to please call back or email if she has not heard from us by then.

## 2020-03-10 NOTE — ADDENDUM NOTE
Addended by: LUIS RASHEED on: 12/21/2018 07:55 AM     Modules accepted: Orders     SUBJECTIVE:    Patient ID: Romario is a 7 month old male. Historian is his parents.    Chief Complaint   Patient presents with   • Cough     started yesterday       History of Present Illness:      Patient is here with his parents for evaluation of a cough which started yesterday.  The cough is a wet cough worse at night.  He has some dry discharge in the nose but no runny nose.  He is eating and drinking well.  He is active and playful.  He is teething with possible associated loose stools.  No fever.  Patient's 2 older brothers have similar coughs without fever.  No other known sick contacts.    Patient Active Problem List    Diagnosis Date Noted   • Congenital dermal melanocytosis 2019     Priority: Low     buttocks       Past Medical History:   Diagnosis Date   • Hypospadias     minor chordae   • Hypospadias 2019    minor chordae Going for repair at 6 months     No past surgical history on file.  Family History   Problem Relation Age of Onset   • Hypertension Father    • Other Brother         hypospadius   • Diabetes Maternal Grandmother    • Hypertension Maternal Grandmother    • Diabetes Maternal Grandfather    • Diabetes Paternal Grandmother    • Diabetes Paternal Grandfather      Social History     Social History Narrative    Lives with parents and siblings    Speaks Croatian at home     No outpatient medications prior to visit.     No facility-administered medications prior to visit.      ALLERGIES:  No Known Allergies    Review of Systems   Constitutional: Negative for activity change, appetite change, crying, fever and irritability.   HENT: Negative for congestion, rhinorrhea and sneezing.    Eyes: Negative for discharge and redness.   Respiratory: Positive for cough. Negative for choking, wheezing and stridor.    Cardiovascular: Negative for fatigue with feeds and sweating with feeds.   Gastrointestinal: Negative for constipation, diarrhea and vomiting.   Skin: Negative for rash.        OBJECTIVE:  Visit Vitals  Temp 98.3 °F (36.8 °C) (Temporal)   Wt 9.415 kg (20 lb 12.1 oz)     Physical Exam  Constitutional:       General: He is active. He is not in acute distress.     Appearance: He is well-developed. He is not toxic-appearing.   HENT:      Head: Normocephalic. Anterior fontanelle is flat.      Right Ear: Tympanic membrane normal.      Left Ear: Tympanic membrane normal.      Nose: Nose normal.      Mouth/Throat:      Mouth: Mucous membranes are moist.      Pharynx: Oropharynx is clear. No posterior oropharyngeal erythema.   Eyes:      General:         Right eye: No discharge.         Left eye: No discharge.      Conjunctiva/sclera: Conjunctivae normal.   Neck:      Musculoskeletal: Neck supple.   Cardiovascular:      Rate and Rhythm: Normal rate and regular rhythm.      Heart sounds: No murmur.   Pulmonary:      Effort: Pulmonary effort is normal.      Breath sounds: Normal breath sounds.   Abdominal:      General: Bowel sounds are normal.      Palpations: Abdomen is soft.      Tenderness: There is no abdominal tenderness.   Lymphadenopathy:      Cervical: No cervical adenopathy.   Skin:     General: Skin is warm and dry.      Capillary Refill: Capillary refill takes less than 2 seconds.      Findings: No rash.   Neurological:      Mental Status: He is alert.         No results found for this or any previous visit (from the past 168 hour(s)).    ASSESSMENT & PLAN:  Cough    Upper respiratory tract infection, unspecified type      Romario is well-hydrated and tolerating symptoms without signs of respiratory distress.  I instructed on home care including pushing fluids to stay hydrated, taking Tylenol or Motrin for symptoms and monitoring for worsening symptoms.  I instructed family to call if problem worsens or does not improve within the next few days.    Anita Lynch MD  [facility]  [address] [place] [address] [place], [address]  [place] [address]  O: [phone]  F:  [phone]    3/10/2020

## 2021-05-10 ENCOUNTER — PATIENT MESSAGE (OUTPATIENT)
Dept: RESEARCH | Facility: HOSPITAL | Age: 46
End: 2021-05-10

## 2023-01-16 ENCOUNTER — OFFICE VISIT (OUTPATIENT)
Dept: URGENT CARE | Facility: CLINIC | Age: 48
End: 2023-01-16
Payer: COMMERCIAL

## 2023-01-16 VITALS
TEMPERATURE: 99 F | OXYGEN SATURATION: 97 % | RESPIRATION RATE: 16 BRPM | SYSTOLIC BLOOD PRESSURE: 130 MMHG | WEIGHT: 169 LBS | BODY MASS INDEX: 29.95 KG/M2 | HEIGHT: 63 IN | HEART RATE: 84 BPM | DIASTOLIC BLOOD PRESSURE: 89 MMHG

## 2023-01-16 DIAGNOSIS — R09.81 SINUS CONGESTION: Primary | ICD-10-CM

## 2023-01-16 LAB
CTP QC/QA: YES
CTP QC/QA: YES
POC MOLECULAR INFLUENZA A AGN: NEGATIVE
POC MOLECULAR INFLUENZA B AGN: NEGATIVE
SARS-COV-2 AG RESP QL IA.RAPID: NEGATIVE

## 2023-01-16 PROCEDURE — 3008F BODY MASS INDEX DOCD: CPT | Mod: CPTII,S$GLB,, | Performed by: PHYSICIAN ASSISTANT

## 2023-01-16 PROCEDURE — 87811 SARS-COV-2 COVID19 W/OPTIC: CPT | Mod: QW,S$GLB,, | Performed by: PHYSICIAN ASSISTANT

## 2023-01-16 PROCEDURE — 3079F PR MOST RECENT DIASTOLIC BLOOD PRESSURE 80-89 MM HG: ICD-10-PCS | Mod: CPTII,S$GLB,, | Performed by: PHYSICIAN ASSISTANT

## 2023-01-16 PROCEDURE — 99202 PR OFFICE/OUTPT VISIT, NEW, LEVL II, 15-29 MIN: ICD-10-PCS | Mod: S$GLB,,, | Performed by: PHYSICIAN ASSISTANT

## 2023-01-16 PROCEDURE — 3075F SYST BP GE 130 - 139MM HG: CPT | Mod: CPTII,S$GLB,, | Performed by: PHYSICIAN ASSISTANT

## 2023-01-16 PROCEDURE — 1159F PR MEDICATION LIST DOCUMENTED IN MEDICAL RECORD: ICD-10-PCS | Mod: CPTII,S$GLB,, | Performed by: PHYSICIAN ASSISTANT

## 2023-01-16 PROCEDURE — 87502 POCT INFLUENZA A/B MOLECULAR: ICD-10-PCS | Mod: QW,S$GLB,, | Performed by: PHYSICIAN ASSISTANT

## 2023-01-16 PROCEDURE — 1160F RVW MEDS BY RX/DR IN RCRD: CPT | Mod: CPTII,S$GLB,, | Performed by: PHYSICIAN ASSISTANT

## 2023-01-16 PROCEDURE — 87811 SARS CORONAVIRUS 2 ANTIGEN POCT, MANUAL READ: ICD-10-PCS | Mod: QW,S$GLB,, | Performed by: PHYSICIAN ASSISTANT

## 2023-01-16 PROCEDURE — 1160F PR REVIEW ALL MEDS BY PRESCRIBER/CLIN PHARMACIST DOCUMENTED: ICD-10-PCS | Mod: CPTII,S$GLB,, | Performed by: PHYSICIAN ASSISTANT

## 2023-01-16 PROCEDURE — 87502 INFLUENZA DNA AMP PROBE: CPT | Mod: QW,S$GLB,, | Performed by: PHYSICIAN ASSISTANT

## 2023-01-16 PROCEDURE — 99202 OFFICE O/P NEW SF 15 MIN: CPT | Mod: S$GLB,,, | Performed by: PHYSICIAN ASSISTANT

## 2023-01-16 PROCEDURE — 3008F PR BODY MASS INDEX (BMI) DOCUMENTED: ICD-10-PCS | Mod: CPTII,S$GLB,, | Performed by: PHYSICIAN ASSISTANT

## 2023-01-16 PROCEDURE — 3075F PR MOST RECENT SYSTOLIC BLOOD PRESS GE 130-139MM HG: ICD-10-PCS | Mod: CPTII,S$GLB,, | Performed by: PHYSICIAN ASSISTANT

## 2023-01-16 PROCEDURE — 1159F MED LIST DOCD IN RCRD: CPT | Mod: CPTII,S$GLB,, | Performed by: PHYSICIAN ASSISTANT

## 2023-01-16 PROCEDURE — 3079F DIAST BP 80-89 MM HG: CPT | Mod: CPTII,S$GLB,, | Performed by: PHYSICIAN ASSISTANT

## 2023-01-16 NOTE — PATIENT INSTRUCTIONS

## 2023-01-16 NOTE — PROGRESS NOTES
"Subjective:       Patient ID: Mandie Lejeune Head is a 47 y.o. female.    Vitals:  height is 5' 2.5" (1.588 m) and weight is 76.7 kg (169 lb). Her temperature is 98.6 °F (37 °C). Her blood pressure is 130/89 and her pulse is 84. Her respiration is 16 and oxygen saturation is 97%.     Chief Complaint: Nasal Congestion    Patient presents to Urgent Care today for sinus congestion and postnasal drainage x's 2 days. Patient is currently taking OTC decongestant with temporary relief.    Cough  This is a new problem. The current episode started in the past 7 days. The problem has been resolved. The problem occurs constantly. Associated symptoms include nasal congestion and postnasal drip. Pertinent negatives include no chills or fever.     Constitution: Negative for chills and fever.   HENT:  Positive for congestion, postnasal drip and sinus pressure.    Respiratory:  Negative for cough.    Allergic/Immunologic: Positive for sneezing.     Objective:      Physical Exam   Constitutional: She does not appear ill. No distress.   HENT:   Head: Normocephalic and atraumatic.   Ears:   Right Ear: Tympanic membrane, external ear and ear canal normal.   Left Ear: Tympanic membrane, external ear and ear canal normal.   Mouth/Throat: Mucous membranes are moist. No oropharyngeal exudate or posterior oropharyngeal erythema. Oropharynx is clear.   Eyes: Conjunctivae are normal. Right eye exhibits no discharge. Left eye exhibits no discharge. Extraocular movement intact   Cardiovascular: Normal rate, regular rhythm and normal heart sounds.   No murmur heard.  Pulmonary/Chest: Effort normal and breath sounds normal. She has no wheezes. She has no rhonchi. She has no rales.   Abdominal: Normal appearance.   Musculoskeletal: Normal range of motion.         General: Normal range of motion.   Neurological: no focal deficit. She is alert.   Skin: Skin is warm, dry and not pale. jaundice  Psychiatric: Her behavior is normal. Mood, judgment and " thought content normal.   Nursing note and vitals reviewed.      Assessment:       1. Sinus congestion          Plan:         Sinus congestion  -     POCT Influenza A/B MOLECULAR  -     SARS Coronavirus 2 Antigen, POCT Manual Read      Results for orders placed or performed in visit on 01/16/23   POCT Influenza A/B MOLECULAR   Result Value Ref Range    POC Molecular Influenza A Ag Negative Negative, Not Reported    POC Molecular Influenza B Ag Negative Negative, Not Reported     Acceptable Yes    SARS Coronavirus 2 Antigen, POCT Manual Read   Result Value Ref Range    SARS Coronavirus 2 Antigen Negative Negative     Acceptable Yes             No fever or cough.  Discussed allergies versus viral URI.  Continue to treat symptomatically.     Patient Instructions   You must understand that you've received an Urgent Care treatment only and that you may be released before all your medical problems are known or treated. You, the patient, will arrange for follow up care as instructed.  Follow up with your PCP or specialty clinic as directed in the next 1-2 weeks if not improved or as needed.  You can call [phone] to schedule an appointment with the appropriate provider.  If your condition worsens we recommend that you receive another evaluation at the emergency room immediately or contact your primary medical clinics after hours call service to discuss your concerns.  Please return here or go to the Emergency Department for any concerns or worsening of condition.

## 2023-01-16 NOTE — LETTER
January 16, 2023      Urgent Care - [place] [address], [address]  [place] [address]  Phone: [phone]  Fax: [phone]       Patient: Shi Saleh   YOB: 1975  Date of Visit: 01/16/2023    To Whom It May Concern:    Saad Saleh  was at [facility] on 01/16/2023. The patient may return to work/school on 1/16/2023 with no restrictions. If you have any questions or concerns, or if I can be of further assistance, please do not hesitate to contact me.    Sincerely,    BENIGNO Vigil

## 2023-03-30 ENCOUNTER — PATIENT MESSAGE (OUTPATIENT)
Dept: OBSTETRICS AND GYNECOLOGY | Facility: CLINIC | Age: 48
End: 2023-03-30
Payer: COMMERCIAL

## 2023-07-05 ENCOUNTER — OFFICE VISIT (OUTPATIENT)
Dept: OBSTETRICS AND GYNECOLOGY | Facility: CLINIC | Age: 48
End: 2023-07-05
Payer: COMMERCIAL

## 2023-07-05 VITALS
HEIGHT: 63 IN | SYSTOLIC BLOOD PRESSURE: 126 MMHG | WEIGHT: 179.25 LBS | DIASTOLIC BLOOD PRESSURE: 82 MMHG | BODY MASS INDEX: 31.76 KG/M2

## 2023-07-05 DIAGNOSIS — N95.1 MENOPAUSAL SYMPTOMS: ICD-10-CM

## 2023-07-05 DIAGNOSIS — Z01.419 GYNECOLOGIC EXAM NORMAL: Primary | ICD-10-CM

## 2023-07-05 PROCEDURE — 3079F DIAST BP 80-89 MM HG: CPT | Mod: CPTII,S$GLB,, | Performed by: OBSTETRICS & GYNECOLOGY

## 2023-07-05 PROCEDURE — 3074F SYST BP LT 130 MM HG: CPT | Mod: CPTII,S$GLB,, | Performed by: OBSTETRICS & GYNECOLOGY

## 2023-07-05 PROCEDURE — 3008F BODY MASS INDEX DOCD: CPT | Mod: CPTII,S$GLB,, | Performed by: OBSTETRICS & GYNECOLOGY

## 2023-07-05 PROCEDURE — 1159F PR MEDICATION LIST DOCUMENTED IN MEDICAL RECORD: ICD-10-PCS | Mod: CPTII,S$GLB,, | Performed by: OBSTETRICS & GYNECOLOGY

## 2023-07-05 PROCEDURE — 3079F PR MOST RECENT DIASTOLIC BLOOD PRESSURE 80-89 MM HG: ICD-10-PCS | Mod: CPTII,S$GLB,, | Performed by: OBSTETRICS & GYNECOLOGY

## 2023-07-05 PROCEDURE — 3008F PR BODY MASS INDEX (BMI) DOCUMENTED: ICD-10-PCS | Mod: CPTII,S$GLB,, | Performed by: OBSTETRICS & GYNECOLOGY

## 2023-07-05 PROCEDURE — 99999 PR PBB SHADOW E&M-EST. PATIENT-LVL III: ICD-10-PCS | Mod: PBBFAC,,, | Performed by: OBSTETRICS & GYNECOLOGY

## 2023-07-05 PROCEDURE — 3074F PR MOST RECENT SYSTOLIC BLOOD PRESSURE < 130 MM HG: ICD-10-PCS | Mod: CPTII,S$GLB,, | Performed by: OBSTETRICS & GYNECOLOGY

## 2023-07-05 PROCEDURE — 99396 PR PREVENTIVE VISIT,EST,40-64: ICD-10-PCS | Mod: S$GLB,,, | Performed by: OBSTETRICS & GYNECOLOGY

## 2023-07-05 PROCEDURE — 1159F MED LIST DOCD IN RCRD: CPT | Mod: CPTII,S$GLB,, | Performed by: OBSTETRICS & GYNECOLOGY

## 2023-07-05 PROCEDURE — 99999 PR PBB SHADOW E&M-EST. PATIENT-LVL III: CPT | Mod: PBBFAC,,, | Performed by: OBSTETRICS & GYNECOLOGY

## 2023-07-05 PROCEDURE — 88175 CYTOPATH C/V AUTO FLUID REDO: CPT | Performed by: OBSTETRICS & GYNECOLOGY

## 2023-07-05 PROCEDURE — 99396 PREV VISIT EST AGE 40-64: CPT | Mod: S$GLB,,, | Performed by: OBSTETRICS & GYNECOLOGY

## 2023-07-05 NOTE — PROGRESS NOTES
Chief Complaint   Patient presents with    Well Woman       History and Physical:  No LMP recorded. Patient has had an implant.       Mandie Lejeune Head is a 47 y.o.   female who presents today for her routine annual GYN exam. The patient has no Gynecology complaints today. Menopausal sx- request testing,.       Allergies:   Review of patient's allergies indicates:   Allergen Reactions    Cymbalta [duloxetine] Other (See Comments)       Past Medical History:   Diagnosis Date    Anemia, unspecified     Anxiety state, unspecified     Attention deficit disorder without mention of hyperactivity     Emotional instability     Herpes simplex without mention of complication     vaginal    Memory loss     Migraine, unspecified, without mention of intractable migraine without mention of status migrainosus     Unspecified vitamin B deficiency        Past Surgical History:   Procedure Laterality Date    breast reduction      COLONOSCOPY N/A 2018    Procedure: COLONOSCOPY;  Surgeon: NELLY Naranjo MD;  Location: [facility];  Service: Endoscopy;  Laterality: N/A;    GASTRIC BYPASS      TOTAL REDUCTION MAMMOPLASTY      Vaginal Fissure         MEDS:   Current Outpatient Medications on File Prior to Visit   Medication Sig Dispense Refill    b complex vitamins tablet Take 1 tablet by mouth once daily.      calcium-vitamin D3 500 mg(1,250mg) -200 unit per tablet Take 1 tablet by mouth 2 (two) times daily.      cyanocobalamin 1,000 mcg/mL injection Inject 1 mL (1,000 mcg total) into the skin every 30 days. 10 mL 0    multivitamin (THERAGRAN) per tablet Take 1 tablet by mouth once daily.      tiZANidine (ZANAFLEX) 4 MG tablet take 1 HOUR prior to intercourse for dyspareunia 30 tablet 3    VYVANSE 50 mg capsule TAKE 1 CAPSULE BY MOUTH EVERY MORNING 30 capsule 0     No current facility-administered medications on file prior to visit.       OB History          4    Para   4    Term   2             AB        Living             SAB        IAB        Ectopic        Multiple        Live Births                     Social History     Socioeconomic History    Marital status:    Tobacco Use    Smoking status: Former     Types: Cigarettes     Quit date: 2002     Years since quittin.8    Smokeless tobacco: Never   Substance and Sexual Activity    Alcohol use: No    Drug use: No    Sexual activity: Yes     Partners: Male     Birth control/protection: I.U.D.     Social Determinants of Health     Financial Resource Strain: Low Risk     Difficulty of Paying Living Expenses: Not hard at all   Food Insecurity: No Food Insecurity    Worried About Running Out of Food in the Last Year: Never true    Ran Out of Food in the Last Year: Never true   Transportation Needs: Unmet Transportation Needs    Lack of Transportation (Medical): Yes    Lack of Transportation (Non-Medical): Yes   Physical Activity: Insufficiently Active    Days of Exercise per Week: 2 days    Minutes of Exercise per Session: 20 min   Stress: No Stress Concern Present    Feeling of Stress : Not at all   Social Connections: Unknown    Frequency of Communication with Friends and Family: Three times a week    Frequency of Social Gatherings with Friends and Family: Once a week    Active Member of Clubs or Organizations: Yes    Attends Club or Organization Meetings: More than 4 times per year    Marital Status:    Housing Stability: Unknown    Unable to Pay for Housing in the Last Year: No    Unstable Housing in the Last Year: No       Family History   Problem Relation Age of Onset    No Known Problems Mother     Hypertension Father     Diabetes Maternal Grandfather     Stroke Maternal Grandfather     Hypertension Maternal Grandfather     Heart disease Paternal Grandfather     Hypertension Paternal Grandfather     Breast cancer Neg Hx     Ovarian cancer Neg Hx          Past medical and surgical history reviewed.   I have reviewed the patient's  "medical history in detail and updated the computerized patient record.    Review of System:   General: no chills, fever, night sweats, weight gain or weight loss  Psychological: no depression or suicidal ideation  Breasts: no new or changing breast lumps, nipple discharge or masses.  Respiratory: no cough, shortness of breath, or wheezing  Cardiovascular: no chest pain or dyspnea on exertion  Gastrointestinal: no abdominal pain, change in bowel habits, or black or bloody stools  Genito-Urinary: no incontinence, urinary frequency/urgency or vulvar/vaginal symptoms, pelvic pain or abnormal vaginal bleeding.  Musculoskeletal: no gait disturbance or muscular weakness      Physical Exam:   /82   Ht 5' 2.5" (1.588 m)   Wt 81.3 kg (179 lb 3.7 oz)   BMI 32.26 kg/m²   Constitutional: She appears alert and responsive. She appears well-developed, well-groomed, and well-nourished. No distress. OverWeight   HENT:   Head: Normocephalic and atraumatic.   Eyes: Conjunctivae and EOM are normal. No scleral icterus.   Neck: Symmetrical. Normal range of motion. Neck supple. No tracheal deviation present.   Cardiovascular: Normal rate, no rhythm abnormality noted. Extremities without swelling or edema, warm.    Pulmonary/Chest: Normal respiratory Effort. No distress or retractions. She exhibits no tenderness.  Breasts: are symmetrical. Well healed scars.    Right breast exhibits no inverted nipple, no mass, no nipple discharge, no skin change and no tenderness.   Left breast exhibits no inverted nipple, no mass, no nipple discharge, no skin change and no tenderness.  Abdominal: Soft. She exhibits no distension, hernias or masses. There is no tenderness. No enlargement of liver edge or spleen.  There is no rebound and no guarding.   Genitourinary:    External rectal exam shows no thrombosed external hemorrhoids, no lesions.     Pelvic exam was performed with patient supine.   No labial fusion, and symmetrical.    There is no " rash, lesion or injury on the right labia.   There is no rash, lesion or injury on the left labia.   No bleeding and no signs of injury around the vaginal introitus, urethral meatus is normal size and without prolapse or lesions, urethra well supported. The cervix is visualized with no discharge, lesions or friability.   No vaginal discharge found.    No significant Cystocele, Enterocele or rectocele, and cervix and uterus well supported.   Bimanual exam:   The urethra is normal to palpation and there are no palpable vaginal wall masses.   Uterus is not deviated, not enlarged, not fixed, normal shape and not tender.   Cervix exhibits no motion tenderness. Fullness throughout - stool R>L   Right adnexum displays no mass or nodularity and no tenderness.   Left adnexum displays no mass or nodularity and no tenderness.  Musculoskeletal: Normal range of motion.   Lymphadenopathy: No inguinal adenopathy present.   Neurological: She is alert and oriented to person, place, and time. Coordination normal.   Skin: Skin is warm and dry. She is not diaphoretic. No rashes, lesions or ulcers.   Psychiatric: She has a normal mood and affect, oriented to person, place, and time.      Assessment:   Normal annual GYN exam  1. Gynecologic exam normal  Liquid-Based Pap Smear, Screening      Constipation, counseled  Menopausal sx    Plan:   PAP  Mammogram  Screening labs  Follow up in 1 year.  Patient informed will be contacted with results within 2 weeks. Encouraged to please call back or email if she has not heard from us by then.

## 2023-07-07 ENCOUNTER — PATIENT MESSAGE (OUTPATIENT)
Dept: OBSTETRICS AND GYNECOLOGY | Facility: CLINIC | Age: 48
End: 2023-07-07
Payer: COMMERCIAL

## 2023-07-11 ENCOUNTER — PATIENT MESSAGE (OUTPATIENT)
Dept: OBSTETRICS AND GYNECOLOGY | Facility: CLINIC | Age: 48
End: 2023-07-11
Payer: COMMERCIAL

## 2023-07-11 LAB
FINAL PATHOLOGIC DIAGNOSIS: NORMAL
Lab: NORMAL

## 2023-07-26 ENCOUNTER — TELEPHONE (OUTPATIENT)
Dept: OBSTETRICS AND GYNECOLOGY | Facility: CLINIC | Age: 48
End: 2023-07-26
Payer: COMMERCIAL

## 2023-07-26 RX ORDER — SULFAMETHOXAZOLE AND TRIMETHOPRIM 800; 160 MG/1; MG/1
1 TABLET ORAL 2 TIMES DAILY
Qty: 20 TABLET | Refills: 0 | Status: SHIPPED | OUTPATIENT
Start: 2023-07-26 | End: 2023-08-05

## 2023-07-26 NOTE — TELEPHONE ENCOUNTER
Patient states she has a big pimple like bump, fluid filled, noticed on yesterday in the private area(half inside/outside). Painful to walk and sit. Patient would like abx called in. She would rather not come in. Please advise.

## 2023-07-26 NOTE — TELEPHONE ENCOUNTER
----- Message from Amisha Anguiano sent at 7/26/2023  7:30 AM CDT -----  Type: Needs Medical Advice  Who Called:  pt  Best Call Back Number: [phone]  Additional Information: pt is calling the office to speak with the nurse in regards to getting an antibiotic called in for a possible cyst. Pt states that it hurts to sit and to walk. Please call back to advise when the medication is called in. Thanks!    [facility] - [address] [address]  Phone: [phone] Fax: [phone]

## 2023-08-14 ENCOUNTER — OFFICE VISIT (OUTPATIENT)
Dept: URGENT CARE | Facility: CLINIC | Age: 48
End: 2023-08-14
Payer: COMMERCIAL

## 2023-08-14 VITALS
TEMPERATURE: 98 F | DIASTOLIC BLOOD PRESSURE: 78 MMHG | HEIGHT: 62 IN | BODY MASS INDEX: 32.94 KG/M2 | HEART RATE: 78 BPM | SYSTOLIC BLOOD PRESSURE: 134 MMHG | OXYGEN SATURATION: 98 % | RESPIRATION RATE: 16 BRPM | WEIGHT: 179 LBS

## 2023-08-14 DIAGNOSIS — U07.1 COVID-19: Primary | ICD-10-CM

## 2023-08-14 PROCEDURE — 99203 PR OFFICE/OUTPT VISIT, NEW, LEVL III, 30-44 MIN: ICD-10-PCS | Mod: S$GLB,,, | Performed by: FAMILY MEDICINE

## 2023-08-14 PROCEDURE — 99203 OFFICE O/P NEW LOW 30 MIN: CPT | Mod: S$GLB,,, | Performed by: FAMILY MEDICINE

## 2023-08-14 NOTE — LETTER
August 14, 2023      Urgent Care - [place] [address], [address]  [facility] [address]  Phone: [phone]  Fax: [phone]       Patient: Shi Saleh   YOB: 1975  Date of Visit: 08/14/2023    To Whom It May Concern:    Saad Saleh  was at [facility] on 08/14/2023. Please excuse for days missed. OK to return to work on 8/14.  If you have any questions or concerns, or if I can be of further assistance, please do not hesitate to contact me.    Sincerely,    Jv Steiner MD

## 2023-08-14 NOTE — PROGRESS NOTES
"Subjective:      Patient ID: Mandie Lejeune Head is a 47 y.o. female.    Vitals:  height is 5' 2" (1.575 m) and weight is 81.2 kg (179 lb). Her oral temperature is 97.8 °F (36.6 °C). Her blood pressure is 134/78 and her pulse is 78. Her respiration is 16 and oxygen saturation is 98%.     Chief Complaint: Sinus Problem    Pt states tested positive for COVID at home test this past Friday 8/11/23.pt states symptom full blown that Friday but said felt off on Wednesday. Pt works for [employer] and  nurse states Wednesday would be day 1     Sinus Problem  This is a new problem. The current episode started in the past 7 days. The problem has been gradually worsening since onset. Past treatments include oral decongestants. The treatment provided no relief.     ROS   Objective:     Physical Exam    Physical Exam  Vitals signs and nursing note reviewed.   Constitutional:       Appearance: Pt is well-developed. Alert, NAD.  Pt is cooperative.  Non-toxic appearance.  HENT:      Head: Normocephalic and atraumatic. .      Right Ear: External ear normal.      Left Ear: External ear normal.   Eyes:      General: Lids are normal.      Conjunctiva/sclera: Conjunctivae normal. Visual tracking is normal. Right eye exhibits no exudate. Left eye exhibits no exudate. No scleral icterus.     Pupils: Pupils are equal, round  Neck:      Musculoskeletal: range of motion without pain and neck supple.      Trachea: Trachea and phonation normal.     Cardiovascular:      Rate and Rhythm: Normal Rhythm. Extremities well perfused.   Pulmonary:      Effort: Pulmonary effort is normal. No respiratory distress. NO stridor or difficulty breathing     Breath sounds: Normal breath sounds.   Abdomen: NO obvious distention.  Musculoskeletal: Normal range of motion. No ambulation issues  Skin:     General: Skin is warm and dry. No open wounds or abrasions. No petechiae No cyanosis  no jaundice not diaphoretic, not pale, not purpuric  Neurological:      " Mental Status:Pt is alert and oriented to person, place, and time.   Psychiatric:         Speech: Speech normal.         Behavior: Behavior normal.         Thought Content: Thought content normal.         Judgment: Judgment normal.       Assessment:     1. COVID-19        Plan:   Pt needs note for work for return. Feeling better.     COVID-19

## 2023-12-02 ENCOUNTER — PATIENT MESSAGE (OUTPATIENT)
Dept: URGENT CARE | Facility: CLINIC | Age: 48
End: 2023-12-02

## 2023-12-02 ENCOUNTER — OFFICE VISIT (OUTPATIENT)
Dept: URGENT CARE | Facility: CLINIC | Age: 48
End: 2023-12-02
Payer: COMMERCIAL

## 2023-12-02 VITALS
HEART RATE: 83 BPM | OXYGEN SATURATION: 98 % | HEIGHT: 63 IN | SYSTOLIC BLOOD PRESSURE: 130 MMHG | BODY MASS INDEX: 32.11 KG/M2 | DIASTOLIC BLOOD PRESSURE: 76 MMHG | WEIGHT: 181.19 LBS | TEMPERATURE: 98 F | RESPIRATION RATE: 18 BRPM

## 2023-12-02 DIAGNOSIS — J10.1 INFLUENZA B: Primary | ICD-10-CM

## 2023-12-02 DIAGNOSIS — J02.9 SORE THROAT: ICD-10-CM

## 2023-12-02 LAB
CTP QC/QA: YES
CTP QC/QA: YES
FLUAV AG NPH QL: NEGATIVE
FLUBV AG NPH QL: POSITIVE
MOLECULAR STREP A: NEGATIVE

## 2023-12-02 PROCEDURE — 99213 PR OFFICE/OUTPT VISIT, EST, LEVL III, 20-29 MIN: ICD-10-PCS | Mod: S$GLB,,, | Performed by: NURSE PRACTITIONER

## 2023-12-02 PROCEDURE — 87804 INFLUENZA ASSAY W/OPTIC: CPT | Mod: 59,QW,S$GLB, | Performed by: NURSE PRACTITIONER

## 2023-12-02 PROCEDURE — 87651 POCT STREP A MOLECULAR: ICD-10-PCS | Mod: QW,S$GLB,, | Performed by: NURSE PRACTITIONER

## 2023-12-02 PROCEDURE — 99213 OFFICE O/P EST LOW 20 MIN: CPT | Mod: S$GLB,,, | Performed by: NURSE PRACTITIONER

## 2023-12-02 PROCEDURE — 87651 STREP A DNA AMP PROBE: CPT | Mod: QW,S$GLB,, | Performed by: NURSE PRACTITIONER

## 2023-12-02 PROCEDURE — 87804 POCT INFLUENZA A/B: ICD-10-PCS | Mod: 59,QW,S$GLB, | Performed by: NURSE PRACTITIONER

## 2023-12-02 RX ORDER — OSELTAMIVIR PHOSPHATE 75 MG/1
75 CAPSULE ORAL 2 TIMES DAILY
Qty: 10 CAPSULE | Refills: 0 | Status: SHIPPED | OUTPATIENT
Start: 2023-12-02 | End: 2023-12-07

## 2023-12-02 NOTE — LETTER
December 2, 2023      Urgent Care - [place] [address], [address]  [facility] [address]  Phone: [phone]  Fax: [phone]       Patient: Shi Saleh   YOB: 1975  Date of Visit: 12/02/2023    To Whom It May Concern:    Saad Saleh  was at [facility] on 12/02/2023. The patient may return to work/school on 12/04/2023 with no restrictions. If you have any questions or concerns, or if I can be of further assistance, please do not hesitate to contact me.    Sincerely,    Anna Spence MA

## 2023-12-02 NOTE — PATIENT INSTRUCTIONS

## 2023-12-02 NOTE — PROGRESS NOTES
"Subjective:      Patient ID: Mandie Lejeune Head is a 48 y.o. female.    Vitals:  height is 5' 2.99" (1.6 m) and weight is 82.2 kg (181 lb 3.2 oz). Her oral temperature is 97.7 °F (36.5 °C). Her blood pressure is 130/76 and her pulse is 83. Her respiration is 18 and oxygen saturation is 98%.     Chief Complaint: Sore Throat    Pt. Presents with sore throat, congestion, HA onset Wednesday. Treatments taken advil cold/sinus, elderberry syrup with no relief. Pain 6/10. Negative at home COVID test yesterday.     Sore Throat   This is a new problem. The current episode started in the past 7 days. The problem has been gradually worsening. Neither side of throat is experiencing more pain than the other. The maximum temperature recorded prior to her arrival was 101 - 101.9 F. The pain is at a severity of 6/10. The pain is moderate. Associated symptoms include congestion, coughing, headaches and a hoarse voice. Pertinent negatives include no abdominal pain, diarrhea, drooling, ear discharge, ear pain, plugged ear sensation, neck pain, shortness of breath, stridor, swollen glands, trouble swallowing or vomiting. She has had no exposure to strep or mono.       HENT:  Positive for congestion and sore throat. Negative for ear pain, ear discharge, drooling and trouble swallowing.    Neck: Negative for neck pain.   Respiratory:  Positive for cough. Negative for shortness of breath and stridor.    Gastrointestinal:  Negative for abdominal pain, vomiting and diarrhea.   Neurological:  Positive for headaches.      Objective:     Physical Exam   Constitutional: She is oriented to person, place, and time. She appears well-developed. She is cooperative.  Non-toxic appearance. She does not appear ill. No distress.   HENT:   Head: Normocephalic and atraumatic.   Ears:   Right Ear: Hearing, tympanic membrane, external ear and ear canal normal.   Left Ear: Hearing, tympanic membrane, external ear and ear canal normal.   Nose: Nose normal. " No mucosal edema, rhinorrhea or nasal deformity. No epistaxis. Right sinus exhibits no maxillary sinus tenderness and no frontal sinus tenderness. Left sinus exhibits no maxillary sinus tenderness and no frontal sinus tenderness.   Mouth/Throat: Uvula is midline and mucous membranes are normal. No trismus in the jaw. Normal dentition. No uvula swelling. Posterior oropharyngeal erythema present. No oropharyngeal exudate or posterior oropharyngeal edema.   Eyes: Conjunctivae and lids are normal. No scleral icterus.   Neck: Trachea normal and phonation normal. Neck supple. No edema present. No erythema present. No neck rigidity present.   Cardiovascular: Normal rate, regular rhythm, normal heart sounds and normal pulses.   Pulmonary/Chest: Effort normal and breath sounds normal. No respiratory distress. She has no decreased breath sounds. She has no rhonchi.   Abdominal: Normal appearance.   Musculoskeletal: Normal range of motion.         General: No deformity. Normal range of motion.   Neurological: She is alert and oriented to person, place, and time. She exhibits normal muscle tone. Coordination normal.   Skin: Skin is warm, dry, intact, not diaphoretic and not pale.   Psychiatric: Her speech is normal and behavior is normal. Judgment and thought content normal.   Nursing note and vitals reviewed.      Assessment:     1. Influenza B    2. Sore throat      Results for orders placed or performed in visit on 12/02/23   POCT Strep A, Molecular   Result Value Ref Range    Molecular Strep A, POC Negative Negative     Acceptable Yes    POCT Influenza A/B   Result Value Ref Range    Rapid Influenza A Ag Negative Negative    Rapid Influenza B Ag Positive (A) Negative     Acceptable Yes        Plan:       Influenza B  -     oseltamivir (TAMIFLU) 75 MG capsule; Take 1 capsule (75 mg total) by mouth 2 (two) times daily. for 5 days  Dispense: 10 capsule; Refill: 0    Sore throat  -     POCT Strep  A, Molecular  -     POCT Influenza A/B         Patient Instructions   INSTRUCTIONS:  - Rest.  - Drink plenty of fluids.  - Take Tylenol and/or Ibuprofen as directed as needed for fever/pain.  Do not take more than the recommended dose.  - follow up with your PCP within the next 1-2 weeks as needed.  - You must understand that you have received an Urgent Care treatment only and that you may be released before all of your medical problems are known or treated.   - You, the patient, will arrange for follow up care as instructed.   - If your condition worsens or fails to improve we recommend that you receive another evaluation at the ER immediately or contact your PCP to discuss your concerns.   - You can call [phone] or [phone] to help schedule an appointment with the appropriate provider.     -If you smoke cigarettes, it would be beneficial for you to stop.

## 2024-10-08 ENCOUNTER — OFFICE VISIT (OUTPATIENT)
Dept: OBSTETRICS AND GYNECOLOGY | Facility: CLINIC | Age: 49
End: 2024-10-08
Payer: COMMERCIAL

## 2024-10-08 VITALS
DIASTOLIC BLOOD PRESSURE: 84 MMHG | HEIGHT: 63 IN | BODY MASS INDEX: 32.69 KG/M2 | SYSTOLIC BLOOD PRESSURE: 128 MMHG | WEIGHT: 184.5 LBS

## 2024-10-08 DIAGNOSIS — Z01.419 ENCOUNTER FOR WELL WOMAN EXAM WITH ROUTINE GYNECOLOGICAL EXAM: ICD-10-CM

## 2024-10-08 DIAGNOSIS — N91.5 OLIGOMENORRHEA, UNSPECIFIED TYPE: Primary | ICD-10-CM

## 2024-10-08 PROCEDURE — 3079F DIAST BP 80-89 MM HG: CPT | Mod: CPTII,S$GLB,, | Performed by: OBSTETRICS & GYNECOLOGY

## 2024-10-08 PROCEDURE — 99396 PREV VISIT EST AGE 40-64: CPT | Mod: S$GLB,,, | Performed by: OBSTETRICS & GYNECOLOGY

## 2024-10-08 PROCEDURE — 3008F BODY MASS INDEX DOCD: CPT | Mod: CPTII,S$GLB,, | Performed by: OBSTETRICS & GYNECOLOGY

## 2024-10-08 PROCEDURE — 3074F SYST BP LT 130 MM HG: CPT | Mod: CPTII,S$GLB,, | Performed by: OBSTETRICS & GYNECOLOGY

## 2024-10-08 PROCEDURE — 1159F MED LIST DOCD IN RCRD: CPT | Mod: CPTII,S$GLB,, | Performed by: OBSTETRICS & GYNECOLOGY

## 2024-10-08 PROCEDURE — 99999 PR PBB SHADOW E&M-EST. PATIENT-LVL III: CPT | Mod: PBBFAC,,, | Performed by: OBSTETRICS & GYNECOLOGY

## 2024-10-08 NOTE — PROGRESS NOTES
Chief Complaint   Patient presents with    Well Woman       History and Physical:  No LMP recorded. Patient has had an implant.       Mandie Lejeune Head is a 48 y.o.   female who presents today for her routine annual GYN exam. The patient has no Gynecology complaints today. Mirena IUD (lot#[id number])  placed 2018 - due out 2024      Allergies:   Review of patient's allergies indicates:   Allergen Reactions    Cymbalta [duloxetine] Other (See Comments)       Past Medical History:   Diagnosis Date    Anemia, unspecified     Anxiety state, unspecified     Attention deficit disorder without mention of hyperactivity     Emotional instability     Herpes simplex without mention of complication     vaginal    Memory loss     Migraine, unspecified, without mention of intractable migraine without mention of status migrainosus     Unspecified vitamin B deficiency        Past Surgical History:   Procedure Laterality Date    breast reduction      COLONOSCOPY N/A 2018    Procedure: COLONOSCOPY;  Surgeon: NELLY Naranjo MD;  Location: [facility];  Service: Endoscopy;  Laterality: N/A;    GASTRIC BYPASS      TOTAL REDUCTION MAMMOPLASTY      Vaginal Fissure         MEDS:   Current Outpatient Medications on File Prior to Visit   Medication Sig Dispense Refill    b complex vitamins tablet Take 1 tablet by mouth once daily.      calcium-vitamin D3 500 mg(1,250mg) -200 unit per tablet Take 1 tablet by mouth 2 (two) times daily.      cyanocobalamin 1,000 mcg/mL injection INJECT 1 ML into the skin every 30 days 10 mL 0    lisdexamfetamine (VYVANSE) 50 MG capsule Take 1 capsule (50 mg total) by mouth every morning. 30 capsule 0    multivitamin (THERAGRAN) per tablet Take 1 tablet by mouth once daily.      tiZANidine (ZANAFLEX) 4 MG tablet take 1 HOUR prior to intercourse for dyspareunia 30 tablet 3     No current facility-administered medications on file prior to visit.       OB History          4     Para   4    Term   2            AB        Living             SAB        IAB        Ectopic        Multiple        Live Births                     Social History     Socioeconomic History    Marital status:    Tobacco Use    Smoking status: Former     Current packs/day: 0.00     Types: Cigarettes     Quit date: 2002     Years since quittin.1    Smokeless tobacco: Never   Substance and Sexual Activity    Alcohol use: No    Drug use: No    Sexual activity: Yes     Partners: Male     Birth control/protection: I.U.D.     Social Drivers of Health     Financial Resource Strain: Low Risk  (2024)    Overall Financial Resource Strain (CARDIA)     Difficulty of Paying Living Expenses: Not hard at all   Food Insecurity: No Food Insecurity (2024)    Hunger Vital Sign     Worried About Running Out of Food in the Last Year: Never true     Ran Out of Food in the Last Year: Never true   Transportation Needs: No Transportation Needs (2024)    PRAPARE - Transportation     Lack of Transportation (Medical): No     Lack of Transportation (Non-Medical): No   Physical Activity: Insufficiently Active (2024)    Exercise Vital Sign     Days of Exercise per Week: 2 days     Minutes of Exercise per Session: 20 min   Stress: No Stress Concern Present (2024)    [employer] [employer] - Occupational Stress Questionnaire     Feeling of Stress : Not at all   Housing Stability: Low Risk  (2024)    Housing Stability Vital Sign     Unable to Pay for Housing in the Last Year: No     Number of Places Lived in the Last Year: 1     Unstable Housing in the Last Year: No       Family History   Problem Relation Name Age of Onset    No Known Problems Mother      Hypertension Father      Diabetes Maternal Grandfather      Stroke Maternal Grandfather      Hypertension Maternal Grandfather      Heart disease Paternal Grandfather      Hypertension Paternal Grandfather      Breast cancer Neg Hx       "Ovarian cancer Neg Hx           Past medical and surgical history reviewed.   I have reviewed the patient's medical history in detail and updated the computerized patient record.    Review of System:   General: no chills, fever, night sweats, weight gain or weight loss  Psychological: no depression or suicidal ideation  Breasts: no new or changing breast lumps, nipple discharge or masses.  Respiratory: no cough, shortness of breath, or wheezing  Cardiovascular: no chest pain or dyspnea on exertion  Gastrointestinal: no abdominal pain, change in bowel habits, or black or bloody stools  Genito-Urinary: no incontinence, urinary frequency/urgency or vulvar/vaginal symptoms, pelvic pain or abnormal vaginal bleeding.  Musculoskeletal: no gait disturbance or muscular weakness      Physical Exam:   /84 (BP Location: Right arm, Patient Position: Sitting)   Ht 5' 2.99" (1.6 m)   Wt 83.7 kg (184 lb 8.4 oz)   BMI 32.70 kg/m²   Constitutional: She appears alert and responsive. She appears well-developed, well-groomed, and well-nourished. No distress. OverWeight   HENT:   Head: Normocephalic and atraumatic.   Eyes: Conjunctivae and EOM are normal. No scleral icterus.   Neck: Symmetrical. Normal range of motion. Neck supple. No tracheal deviation present.   Cardiovascular: Normal rate, no rhythm abnormality noted. Extremities without swelling or edema, warm.    Pulmonary/Chest: Normal respiratory Effort. No distress or retractions. She exhibits no tenderness.  Breasts: are symmetrical.   Right breast exhibits no inverted nipple, no mass, no nipple discharge, no skin change and no tenderness.   Left breast exhibits no inverted nipple, no mass, no nipple discharge, no skin change and no tenderness.  Abdominal: Soft. She exhibits no distension, hernias or masses. There is no tenderness. No enlargement of liver edge or spleen.  There is no rebound and no guarding.   Genitourinary:    External rectal exam shows no thrombosed " external hemorrhoids, no lesions.     Pelvic exam was performed with patient supine.   No labial fusion, and symmetrical.    There is no rash, lesion or injury on the right labia.   There is no rash, lesion or injury on the left labia.   No bleeding and no signs of injury around the vaginal introitus, urethral meatus is normal size and without prolapse or lesions, urethra well supported. The cervix is visualized with no discharge, lesions or friability. Unable to visualize IUD string   No vaginal discharge found.    No significant Cystocele, Enterocele or rectocele, and cervix and uterus well supported.   Bimanual exam:   The urethra is normal to palpation and there are no palpable vaginal wall masses.   Uterus is not deviated, not enlarged, not fixed, normal shape and not tender.   Cervix exhibits no motion tenderness.    Right adnexum displays no mass or nodularity and no tenderness.   Left adnexum displays no mass or nodularity and no tenderness.  Musculoskeletal: Normal range of motion.   Neurological: She is alert and oriented to person, place, and time. Coordination normal.   Skin: Skin is warm and dry. She is not diaphoretic. No rashes, lesions or ulcers.   Psychiatric: She has a normal mood and affect, oriented to person, place, and time.      Assessment:   Normal annual GYN exam  Lost IUD string - due out 12/2024    Plan:   PAP  Mammogram, pelvic u/s - fsh, schedule iud replacement if FSH <40  Follow up in 2 months.  Patient informed will be contacted with results within 2 weeks. Encouraged to please call back or email if she has not heard from us by then.

## 2024-10-18 ENCOUNTER — PATIENT MESSAGE (OUTPATIENT)
Dept: OBSTETRICS AND GYNECOLOGY | Facility: CLINIC | Age: 49
End: 2024-10-18
Payer: COMMERCIAL

## 2024-10-28 ENCOUNTER — PATIENT MESSAGE (OUTPATIENT)
Dept: OBSTETRICS AND GYNECOLOGY | Facility: CLINIC | Age: 49
End: 2024-10-28
Payer: COMMERCIAL

## 2024-10-31 ENCOUNTER — PATIENT MESSAGE (OUTPATIENT)
Dept: OBSTETRICS AND GYNECOLOGY | Facility: CLINIC | Age: 49
End: 2024-10-31
Payer: COMMERCIAL

## 2024-11-08 DIAGNOSIS — Z30.9 ENCOUNTER FOR CONTRACEPTIVE MANAGEMENT, UNSPECIFIED TYPE: Primary | ICD-10-CM

## 2024-11-26 ENCOUNTER — OFFICE VISIT (OUTPATIENT)
Dept: OBSTETRICS AND GYNECOLOGY | Facility: CLINIC | Age: 49
End: 2024-11-26
Payer: COMMERCIAL

## 2024-11-26 VITALS
DIASTOLIC BLOOD PRESSURE: 86 MMHG | WEIGHT: 176.38 LBS | BODY MASS INDEX: 31.74 KG/M2 | SYSTOLIC BLOOD PRESSURE: 126 MMHG

## 2024-11-26 DIAGNOSIS — Z30.430 ENCOUNTER FOR IUD INSERTION: Primary | ICD-10-CM

## 2024-11-26 DIAGNOSIS — Z30.432 ENCOUNTER FOR IUD REMOVAL: ICD-10-CM

## 2024-11-26 PROCEDURE — 88300 SURGICAL PATH GROSS: CPT | Performed by: STUDENT IN AN ORGANIZED HEALTH CARE EDUCATION/TRAINING PROGRAM

## 2024-11-26 PROCEDURE — 99999 PR PBB SHADOW E&M-EST. PATIENT-LVL III: CPT | Mod: PBBFAC,,, | Performed by: OBSTETRICS & GYNECOLOGY

## 2024-11-26 NOTE — PROGRESS NOTES
History of Present Illness:   Pateint presents today  For IUD removal,replacement.    Pathology: none    Physical exam:  /78   Wt 82 kg (180 lb 12.4 oz)   BMI 36.51 kg/m²   Constitutional: She is oriented to person, place, and time. She appears well-developed and well-nourished. No distress.   HENT:   Head: Normocephalic and atraumatic.   Eyes: Conjunctivae and EOM are normal. No scleral icterus.   Neck: Normal range of motion. Neck supple. No tracheal deviation present.   Cardiovascular: Normal rate.    Pulmonary/Chest: Effort normal. No respiratory distress. She exhibits no tenderness.  Breasts: deferred  Abdominal: Soft. She exhibits no distension and no mass. There is no rebound and no guarding.   Genitourinary:     External rectal exam shows no thrombosed external hemorrhoids.    Pelvic exam was performed with patient supine.   There is no rash, lesion or injury on the right labia.   There is no rash, lesion or injury on the left labia.   No bleeding and no signs of injury around the vaginal introitus, urethra is without lesions and well supported. The cervix is visualized with no discharge, lesions or friability. Intrauterine Device string easily visualized, IUD removed with ring forceps without difficulty, tolerated well.    No vaginal discharge found.    No significant Cystocele, Enterocele or rectocele, and uterus well supported.   Bimanual exam: deferred  Neurological: She is alert and oriented to person, place, and time. Coordination normal.   Skin: Skin is warm and dry. She is not diaphoretic.   Psychiatric: She has a normal mood and affect.      Intrauterine device Placement:  11/26/2024  PRE-IUD PLACEMENT COUNSELING:  All contraceptive options were reviewed and the patient chooses an IUD.  The patient's history was reviewed and there are no contraindications to an IUD. The procedure and minimal risks of pain, bleeding, perforation and infection at the insertion and spontaneous expulsion  within the first two weeks was discussed. The benefits of amenorrhea and no systemic side effects were explained. All questions were answered and the patient agrees to proceed. Consent was signed (scanned into computer).    EXAM:  Uterine Position: mid    PROCEDURE:  TIME OUT PERFORMED.  The cervix visualized with a speculum.  A single tooth tenaculum was not placed on the anterior lip.  The uterus sounded to 9cm using sterile technique.  A Mirena IUD (lot#[id number])  was loaded and placed high in uterine fundus without difficulty using sterile technique.  The string was cut to 2cm length from exo cervix.   All instruments were removed from the cervix and vagina and the procedure was tolerated well.     ASSESSMENT:  1. Contraception management / IUD insertion.V25.0.    POST IUD PLACEMENT COUNSELING:  Manage post IUD placement pain with NSAIDs, Tylenol or Rx per [facility].  IUD danger signs and how to check the strings.  Removal in 5 years for Mirena IUD and in 10 years for Copper IUD.    Counseling lasted approximately 15 minutes and all her questions were answered.    FOLLOW-UP: With me in four weeks.

## 2024-11-27 LAB
FINAL PATHOLOGIC DIAGNOSIS: NORMAL
GROSS: NORMAL
Lab: NORMAL

## 2025-01-13 ENCOUNTER — OFFICE VISIT (OUTPATIENT)
Dept: OBSTETRICS AND GYNECOLOGY | Facility: CLINIC | Age: 50
End: 2025-01-13
Payer: COMMERCIAL

## 2025-01-13 VITALS
BODY MASS INDEX: 30.91 KG/M2 | SYSTOLIC BLOOD PRESSURE: 130 MMHG | DIASTOLIC BLOOD PRESSURE: 72 MMHG | WEIGHT: 171.75 LBS

## 2025-01-13 DIAGNOSIS — Z30.431 IUD CHECK UP: Primary | ICD-10-CM

## 2025-01-13 PROCEDURE — 99999 PR PBB SHADOW E&M-EST. PATIENT-LVL III: CPT | Mod: PBBFAC,,, | Performed by: OBSTETRICS & GYNECOLOGY

## 2025-01-13 PROCEDURE — 99213 OFFICE O/P EST LOW 20 MIN: CPT | Mod: S$GLB,,, | Performed by: OBSTETRICS & GYNECOLOGY

## 2025-01-13 PROCEDURE — 3078F DIAST BP <80 MM HG: CPT | Mod: CPTII,S$GLB,, | Performed by: OBSTETRICS & GYNECOLOGY

## 2025-01-13 PROCEDURE — 1159F MED LIST DOCD IN RCRD: CPT | Mod: CPTII,S$GLB,, | Performed by: OBSTETRICS & GYNECOLOGY

## 2025-01-13 PROCEDURE — 3008F BODY MASS INDEX DOCD: CPT | Mod: CPTII,S$GLB,, | Performed by: OBSTETRICS & GYNECOLOGY

## 2025-01-13 PROCEDURE — 3075F SYST BP GE 130 - 139MM HG: CPT | Mod: CPTII,S$GLB,, | Performed by: OBSTETRICS & GYNECOLOGY

## 2025-01-13 NOTE — PROGRESS NOTES
History of Present Illness:   Pateint presents today 1 month status post Intrauterine Device  Insertion without complaint.   Mirena placed 11/26/24-  (lot#[id number])     Pathology: none    Physical exam:  /72 (Patient Position: Sitting)   Wt 77.9 kg (171 lb 11.8 oz)   BMI 30.91 kg/m²   Constitutional: She is oriented to person, place, and time. She appears well-developed and well-nourished. No distress.   HENT:   Head: Normocephalic and atraumatic.   Eyes: Conjunctivae and EOM are normal. No scleral icterus.   Neck: Normal range of motion. Neck supple. No tracheal deviation present.   Cardiovascular: Normal rate.    Pulmonary/Chest: Effort normal. No respiratory distress. She exhibits no tenderness.  Breasts: deferred  Abdominal: Soft. She exhibits no distension and no mass. There is no rebound and no guarding.   Genitourinary:     External rectal exam shows no thrombosed external hemorrhoids.    Pelvic exam was performed with patient supine.   There is no rash, lesion or injury on the right labia.   There is no rash, lesion or injury on the left labia.   No bleeding and no signs of injury around the vaginal introitus, urethra is without lesions and well supported. The cervix is visualized with no discharge, lesions or friability. Intrauterine Device string easily visualized in canal   No vaginal discharge found.    No significant Cystocele, Enterocele or rectocele, and uterus well supported.   Bimanual exam:   The urethra is normal to palpation and there are no palpable vaginal wall masses.   Uterus is not deviated, not enlarged, not fixed, normal shape and not tender.   Cervix exhibits no motion tenderness.    Right adnexum displays no mass and no tenderness.   Left adnexum displays no mass and no tenderness.  Neurological: She is alert and oriented to person, place, and time. Coordination normal.   Skin: Skin is warm and dry. She is not diaphoretic.   Psychiatric: She has a normal mood and  affect.    Assessment:  Doing well with IUD.    Plan:  Resume normal activity and follow up as scheduled for routine screening.